# Patient Record
Sex: FEMALE | Race: WHITE | NOT HISPANIC OR LATINO | URBAN - NONMETROPOLITAN AREA
[De-identification: names, ages, dates, MRNs, and addresses within clinical notes are randomized per-mention and may not be internally consistent; named-entity substitution may affect disease eponyms.]

---

## 2017-02-16 ENCOUNTER — OFFICE VISIT (OUTPATIENT)
Dept: OBSTETRICS AND GYNECOLOGY | Facility: CLINIC | Age: 27
End: 2017-02-16

## 2017-02-16 VITALS
HEIGHT: 63 IN | DIASTOLIC BLOOD PRESSURE: 90 MMHG | WEIGHT: 246 LBS | SYSTOLIC BLOOD PRESSURE: 120 MMHG | BODY MASS INDEX: 43.59 KG/M2

## 2017-02-16 DIAGNOSIS — N97.0 INFERTILITY ASSOCIATED WITH ANOVULATION: ICD-10-CM

## 2017-02-16 DIAGNOSIS — E28.2 PCOS (POLYCYSTIC OVARIAN SYNDROME): ICD-10-CM

## 2017-02-16 DIAGNOSIS — E07.9 THYROID DYSFUNCTION: ICD-10-CM

## 2017-02-16 DIAGNOSIS — Z01.419 WELL WOMAN EXAM WITH ROUTINE GYNECOLOGICAL EXAM: Primary | ICD-10-CM

## 2017-02-16 PROCEDURE — 99213 OFFICE O/P EST LOW 20 MIN: CPT | Performed by: NURSE PRACTITIONER

## 2017-02-16 PROCEDURE — 99395 PREV VISIT EST AGE 18-39: CPT | Performed by: NURSE PRACTITIONER

## 2017-02-16 PROCEDURE — G0123 SCREEN CERV/VAG THIN LAYER: HCPCS | Performed by: NURSE PRACTITIONER

## 2017-02-16 RX ORDER — MEDROXYPROGESTERONE ACETATE 10 MG/1
10 TABLET ORAL DAILY
Qty: 30 TABLET | Refills: 3 | Status: SHIPPED | OUTPATIENT
Start: 2017-02-16 | End: 2017-02-26

## 2017-02-16 RX ORDER — SPIRONOLACTONE 50 MG/1
50 TABLET, FILM COATED ORAL 2 TIMES DAILY
Qty: 60 TABLET | Refills: 2 | Status: SHIPPED | OUTPATIENT
Start: 2017-02-16 | End: 2017-08-10 | Stop reason: SDUPTHER

## 2017-02-16 RX ORDER — SPIRONOLACTONE 50 MG/1
TABLET, FILM COATED ORAL
COMMUNITY
Start: 2016-11-16 | End: 2017-02-16 | Stop reason: SDUPTHER

## 2017-02-16 NOTE — PROGRESS NOTES
Subjective   Maritza Vazquez is a 27 y.o. female  YOB: 1990    Est PT is here today for yearly visit.  Pt states that she is doing good with no c/o  Pt does need order for Mammo today as well      Chief Complaint   Patient presents with   • Gynecologic Exam     Pt wants to discuss infertility. Pt needs refill on Metformin       HPI    The following portions of the patient's history were reviewed and updated as appropriate: allergies, current medications, past family history, past medical history, past social history, past surgical history and problem list.    No Known Allergies    Past Medical History   Diagnosis Date   • Goiter    • PCOS (polycystic ovarian syndrome)        Family History   Problem Relation Age of Onset   • No Known Problems Father    • No Known Problems Mother    • No Known Problems Brother    • Colon cancer Maternal Grandfather    • No Known Problems Brother    • Breast cancer Neg Hx    • Ovarian cancer Neg Hx        Social History     Social History   • Marital status:      Spouse name: N/A   • Number of children: N/A   • Years of education: N/A     Occupational History   • Not on file.     Social History Main Topics   • Smoking status: Current Every Day Smoker   • Smokeless tobacco: Not on file   • Alcohol use Yes   • Drug use: No   • Sexual activity: Yes     Other Topics Concern   • Not on file     Social History Narrative   • No narrative on file         Current Outpatient Prescriptions:   •  metFORMIN (GLUCOPHAGE) 1000 MG tablet, , Disp: , Rfl:   •  spironolactone (ALDACTONE) 50 MG tablet, , Disp: , Rfl:   •  clomiPHENE (CLOMID) 50 MG tablet, Take 1 tablet by mouth Daily for 5 days., Disp: 30 tablet, Rfl: 0  •  medroxyPROGESTERone (PROVERA) 10 MG tablet, Take 1 tablet by mouth Daily for 10 days., Disp: 30 tablet, Rfl: 3    Menstrual History:  OB History      Para Term  AB TAB SAB Ectopic Multiple Living    0 0 0 0 0 0 0 0 0 0           Patient's last  menstrual period was 02/01/2017 (approximate).    Sexual History:         Could not be calculated    Past Surgical History   Procedure Laterality Date   • Dilatation and curettage         Review of Systems   Constitutional: Negative for activity change, appetite change, chills, diaphoresis, fatigue, fever and unexpected weight change.   HENT: Negative for congestion, ear discharge, ear pain, facial swelling, hearing loss, mouth sores, nosebleeds, postnasal drip, rhinorrhea, sinus pressure, sneezing, sore throat, tinnitus, trouble swallowing and voice change.    Eyes: Negative for photophobia, pain, discharge, redness, itching and visual disturbance.   Respiratory: Negative for apnea, cough, choking, chest tightness and shortness of breath.    Cardiovascular: Negative for chest pain, palpitations and leg swelling.   Gastrointestinal: Negative for abdominal distention, abdominal pain, anal bleeding, blood in stool, constipation, diarrhea, nausea, rectal pain and vomiting.   Endocrine: Negative for cold intolerance and heat intolerance.   Genitourinary: Positive for menstrual problem (PCOS). Negative for decreased urine volume, difficulty urinating, dyspareunia, flank pain, frequency, genital sores, hematuria, pelvic pain, urgency, vaginal bleeding, vaginal discharge and vaginal pain.   Musculoskeletal: Negative for arthralgias, back pain, joint swelling and myalgias.   Skin: Negative for color change and rash.   Allergic/Immunologic: Negative for environmental allergies.   Neurological: Negative for dizziness, syncope, weakness, numbness and headaches.   Hematological: Negative for adenopathy.   Psychiatric/Behavioral: Negative for agitation, confusion and sleep disturbance. The patient is not nervous/anxious.        Objective   Physical Exam   Constitutional: She is oriented to person, place, and time. She appears well-developed and well-nourished. No distress.   HENT:   Head: Normocephalic.   Right Ear: External ear  normal.   Left Ear: External ear normal.   Nose: Nose normal.   Mouth/Throat: Oropharynx is clear and moist.   Eyes: Conjunctivae are normal. Right eye exhibits no discharge. Left eye exhibits no discharge.   Neck: Normal range of motion. Neck supple. Carotid bruit is not present. No tracheal deviation present. No thyromegaly present.   Cardiovascular: Normal rate, regular rhythm, normal heart sounds and intact distal pulses.    No murmur heard.  Pulmonary/Chest: Effort normal and breath sounds normal. No respiratory distress. She has no wheezes. Right breast exhibits no inverted nipple, no mass, no nipple discharge, no skin change and no tenderness. Left breast exhibits no inverted nipple, no mass, no nipple discharge, no skin change and no tenderness. Breasts are symmetrical. There is no breast swelling.   Abdominal: Soft. She exhibits no distension and no mass. There is no tenderness. There is no guarding. No hernia. Hernia confirmed negative in the right inguinal area and confirmed negative in the left inguinal area.   Genitourinary: Rectum normal, vagina normal and uterus normal. Rectal exam shows no external hemorrhoid, no internal hemorrhoid, no fissure, no mass, no tenderness and anal tone normal. No breast tenderness, discharge or bleeding. Pelvic exam was performed with patient supine. There is no rash, tenderness, lesion or injury on the right labia. There is no rash, tenderness, lesion or injury on the left labia. Uterus is not enlarged, not fixed and not tender. Cervix exhibits no motion tenderness, no discharge and no friability. Right adnexum displays no mass, no tenderness and no fullness. Left adnexum displays no mass, no tenderness and no fullness. No bleeding in the vagina. No vaginal discharge found.   Genitourinary Comments:   BSU normal  Urethral meatus  Normal  Perineum  Normal   Musculoskeletal: Normal range of motion. She exhibits no edema or tenderness.   Lymphadenopathy:        Head (right  "side): No submental, no submandibular, no tonsillar, no preauricular, no posterior auricular and no occipital adenopathy present.        Head (left side): No submental, no submandibular, no tonsillar, no preauricular, no posterior auricular and no occipital adenopathy present.     She has no cervical adenopathy.        Right cervical: No superficial cervical, no deep cervical and no posterior cervical adenopathy present.       Left cervical: No superficial cervical, no deep cervical and no posterior cervical adenopathy present.     She has no axillary adenopathy.        Right: No inguinal adenopathy present.        Left: No inguinal adenopathy present.   Neurological: She is alert and oriented to person, place, and time. Coordination normal.   Skin: Skin is warm and dry. No bruising and no rash noted. She is not diaphoretic. No erythema.   Psychiatric: She has a normal mood and affect. Her behavior is normal. Judgment and thought content normal.   Nursing note and vitals reviewed.        Vitals:    02/16/17 0939   BP: 120/90   Weight: 246 lb (112 kg)   Height: 63\" (160 cm)       Maritza was seen today for gynecologic exam.    Diagnoses and all orders for this visit:    Well woman exam with routine gynecological exam  Comments:  Normal well woman exam.  Thin prep done.    Orders:  -     Vitamin D 25 Hydroxy  -     T4, Free  -     Hemoglobin A1c  -     CBC & Differential  -     Comprehensive Metabolic Panel  -     Lipid Panel With LDL / HDL Ratio  -     TSH  -     Liquid-based Pap Smear, Screening    PCOS (polycystic ovarian syndrome)  Comments:  PCOS labs today - will adjust medications pending results.   Orders:  -     Comprehensive Metabolic Panel  -     Follicle Stimulating Hormone  -     Estradiol  -     Insulin, Total  -     Luteinizing Hormone  -     Progesterone  -     Testosterone  -     DHEA-Sulfate  -     Cortisol  -     Hemoglobin A1c  -     medroxyPROGESTERone (PROVERA) 10 MG tablet; Take 1 tablet by mouth " "Daily for 10 days.    Thyroid dysfunction  Comments:  Was on Synthroid at one time but has not been on meds in \"years\".  Will get thyroid labs today and follow up pending results.    Orders:  -     T4, Free  -     TSH    Infertility associated with anovulation  Comments:  Has not prevented pregnancy in 2 years.  Rarely has a period r/t PCOS.  Will induce bleeding with Provera and take Clomid 50 mg for 5 days beginning on day 5 of cycle.  Is on PNVs .  Patient to keep a bleeding calendar and will RTO to review in 3 months.    Orders:  -     clomiPHENE (CLOMID) 50 MG tablet; Take 1 tablet by mouth Daily for 5 days.  -     medroxyPROGESTERone (PROVERA) 10 MG tablet; Take 1 tablet by mouth Daily for 10 days.        Body mass index is 43.58 kg/(m^2).     Non-Smoker    MyChart Instructions Given       "

## 2017-02-17 LAB
25(OH)D3+25(OH)D2 SERPL-MCNC: 13.1 NG/ML (ref 30–100)
ALBUMIN SERPL-MCNC: 4.7 G/DL (ref 3.5–5)
ALBUMIN/GLOB SERPL: 1.6 G/DL (ref 1.1–2.5)
ALP SERPL-CCNC: 94 U/L (ref 24–120)
ALT SERPL-CCNC: 117 U/L (ref 0–54)
AST SERPL-CCNC: 81 U/L (ref 7–45)
BASOPHILS # BLD AUTO: 0.06 10*3/MM3 (ref 0–0.2)
BASOPHILS NFR BLD AUTO: 0.6 % (ref 0–2)
BILIRUB SERPL-MCNC: 0.6 MG/DL (ref 0.1–1)
BUN SERPL-MCNC: 8 MG/DL (ref 5–21)
BUN/CREAT SERPL: 11.4 (ref 7–25)
CALCIUM SERPL-MCNC: 9.7 MG/DL (ref 8.4–10.4)
CHLORIDE SERPL-SCNC: 102 MMOL/L (ref 98–110)
CHOLEST SERPL-MCNC: 174 MG/DL (ref 130–200)
CO2 SERPL-SCNC: 28 MMOL/L (ref 24–31)
CORTIS SERPL-MCNC: 5.2 UG/DL
CREAT SERPL-MCNC: 0.7 MG/DL (ref 0.5–1.4)
DHEA-S SERPL-MCNC: 246.7 UG/DL (ref 84.8–378)
EOSINOPHIL # BLD AUTO: 0.19 10*3/MM3 (ref 0–0.7)
EOSINOPHIL NFR BLD AUTO: 1.9 % (ref 0–4)
ERYTHROCYTE [DISTWIDTH] IN BLOOD BY AUTOMATED COUNT: 13.1 % (ref 12–15)
ESTRADIOL SERPL-MCNC: 37.9 PG/ML
FSH SERPL-ACNC: 4.3 MIU/ML
GLOBULIN SER CALC-MCNC: 3 GM/DL
GLUCOSE SERPL-MCNC: 78 MG/DL (ref 70–100)
HBA1C MFR BLD: 5.5 %
HCT VFR BLD AUTO: 42.6 % (ref 37–47)
HDLC SERPL-MCNC: 31 MG/DL
HGB BLD-MCNC: 13.9 G/DL (ref 12–16)
IMM GRANULOCYTES # BLD: 0.03 10*3/MM3 (ref 0–0.03)
IMM GRANULOCYTES NFR BLD: 0.3 % (ref 0–5)
INSULIN SERPL-ACNC: 53.9 UIU/ML (ref 2.6–24.9)
LDLC SERPL CALC-MCNC: 93 MG/DL (ref 0–99)
LDLC/HDLC SERPL: 3 {RATIO}
LH SERPL-ACNC: 7 MIU/ML
LYMPHOCYTES # BLD AUTO: 2.64 10*3/MM3 (ref 0.72–4.86)
LYMPHOCYTES NFR BLD AUTO: 26.9 % (ref 15–45)
MCH RBC QN AUTO: 30.3 PG (ref 28–32)
MCHC RBC AUTO-ENTMCNC: 32.6 G/DL (ref 33–36)
MCV RBC AUTO: 93 FL (ref 82–98)
MONOCYTES # BLD AUTO: 0.43 10*3/MM3 (ref 0.19–1.3)
MONOCYTES NFR BLD AUTO: 4.4 % (ref 4–12)
NEUTROPHILS # BLD AUTO: 6.45 10*3/MM3 (ref 1.87–8.4)
NEUTROPHILS NFR BLD AUTO: 65.9 % (ref 39–78)
PLATELET # BLD AUTO: 281 10*3/MM3 (ref 130–400)
POTASSIUM SERPL-SCNC: 4.4 MMOL/L (ref 3.5–5.3)
PROGEST SERPL-MCNC: <0.1 NG/ML
PROT SERPL-MCNC: 7.7 G/DL (ref 6.3–8.7)
RBC # BLD AUTO: 4.58 10*6/MM3 (ref 4.2–5.4)
SODIUM SERPL-SCNC: 140 MMOL/L (ref 135–145)
T4 FREE SERPL-MCNC: 1.12 NG/DL (ref 0.78–2.19)
TESTOST SERPL-MCNC: 55 NG/DL (ref 8–48)
TRIGL SERPL-MCNC: 250 MG/DL (ref 0–149)
TSH SERPL DL<=0.005 MIU/L-ACNC: 3.67 MIU/ML (ref 0.47–4.68)
VLDLC SERPL CALC-MCNC: 50 MG/DL
WBC # BLD AUTO: 9.8 10*3/MM3 (ref 4.8–10.8)

## 2017-02-24 LAB
GEN CATEG CVX/VAG CYTO-IMP: NORMAL
LAB AP CASE REPORT: NORMAL
LAB AP GYN ADDITIONAL INFORMATION: NORMAL
LAB AP GYN OTHER FINDINGS: NORMAL
Lab: NORMAL
PATH INTERP SPEC-IMP: NORMAL
STAT OF ADQ CVX/VAG CYTO-IMP: NORMAL

## 2017-02-28 DIAGNOSIS — B96.89 BACTERIAL VAGINOSIS: Primary | ICD-10-CM

## 2017-02-28 DIAGNOSIS — N76.0 BACTERIAL VAGINOSIS: Primary | ICD-10-CM

## 2017-03-02 ENCOUNTER — TELEPHONE (OUTPATIENT)
Dept: OBSTETRICS AND GYNECOLOGY | Facility: CLINIC | Age: 27
End: 2017-03-02

## 2017-03-02 NOTE — TELEPHONE ENCOUNTER
I spoke to patient yesterday and she said that now that she knew about savings card it would work fine.  If not though, we can send in Metrogel 1 applicatorful qhs times 5 days.

## 2017-03-02 NOTE — TELEPHONE ENCOUNTER
Pharmacy calls and states that the clindesse is not covered for patient and would like an alternative.

## 2017-05-18 ENCOUNTER — OFFICE VISIT (OUTPATIENT)
Dept: OBSTETRICS AND GYNECOLOGY | Facility: CLINIC | Age: 27
End: 2017-05-18

## 2017-05-18 VITALS — DIASTOLIC BLOOD PRESSURE: 84 MMHG | BODY MASS INDEX: 43.93 KG/M2 | SYSTOLIC BLOOD PRESSURE: 116 MMHG | WEIGHT: 248 LBS

## 2017-05-18 DIAGNOSIS — N97.0 INFERTILITY ASSOCIATED WITH ANOVULATION: Primary | ICD-10-CM

## 2017-05-18 PROCEDURE — 99213 OFFICE O/P EST LOW 20 MIN: CPT | Performed by: NURSE PRACTITIONER

## 2017-07-25 DIAGNOSIS — E66.3 OVERWEIGHT: Primary | ICD-10-CM

## 2017-07-25 RX ORDER — PHENTERMINE HYDROCHLORIDE 37.5 MG/1
37.5 CAPSULE ORAL EVERY MORNING
Qty: 30 CAPSULE | Refills: 0 | Status: SHIPPED | OUTPATIENT
Start: 2017-07-25 | End: 2017-08-24 | Stop reason: SDUPTHER

## 2017-08-10 DIAGNOSIS — E28.2 PCOS (POLYCYSTIC OVARIAN SYNDROME): ICD-10-CM

## 2017-08-10 RX ORDER — SPIRONOLACTONE 50 MG/1
50 TABLET, FILM COATED ORAL 2 TIMES DAILY
Qty: 60 TABLET | Refills: 2 | Status: SHIPPED | OUTPATIENT
Start: 2017-08-10 | End: 2017-10-23 | Stop reason: SDUPTHER

## 2017-08-24 ENCOUNTER — OFFICE VISIT (OUTPATIENT)
Dept: OBSTETRICS AND GYNECOLOGY | Facility: CLINIC | Age: 27
End: 2017-08-24

## 2017-08-24 VITALS
BODY MASS INDEX: 42.35 KG/M2 | DIASTOLIC BLOOD PRESSURE: 86 MMHG | HEIGHT: 63 IN | SYSTOLIC BLOOD PRESSURE: 128 MMHG | WEIGHT: 239 LBS

## 2017-08-24 PROCEDURE — 99213 OFFICE O/P EST LOW 20 MIN: CPT | Performed by: NURSE PRACTITIONER

## 2017-08-24 RX ORDER — PHENTERMINE HYDROCHLORIDE 37.5 MG/1
37.5 CAPSULE ORAL EVERY MORNING
Qty: 30 CAPSULE | Refills: 0 | Status: SHIPPED | OUTPATIENT
Start: 2017-08-24 | End: 2017-09-21 | Stop reason: SDUPTHER

## 2017-08-24 RX ORDER — PHENTERMINE HYDROCHLORIDE 37.5 MG/1
37.5 TABLET ORAL DAILY
Refills: 0 | COMMUNITY
Start: 2017-07-25 | End: 2017-09-21 | Stop reason: SDUPTHER

## 2017-08-24 NOTE — PROGRESS NOTES
Subjective   Maritza Vazquez is a 27 y.o. female.     History of Present Illness    The following portions of the patient's history were reviewed and updated as appropriate: allergies, current medications, past family history, past medical history, past social history, past surgical history and problem list.    Review of Systems   Constitutional: Negative for activity change, appetite change, chills, diaphoresis, fatigue, fever and unexpected weight change.   HENT: Negative for congestion, ear discharge, ear pain, facial swelling, hearing loss, mouth sores, nosebleeds, postnasal drip, rhinorrhea, sinus pressure, sneezing, sore throat, tinnitus, trouble swallowing and voice change.    Eyes: Negative for photophobia, pain, discharge, redness, itching and visual disturbance.   Respiratory: Negative for apnea, cough, choking, chest tightness and shortness of breath.    Cardiovascular: Negative for chest pain, palpitations and leg swelling.   Gastrointestinal: Negative for abdominal distention, abdominal pain, anal bleeding, blood in stool, constipation, diarrhea, nausea, rectal pain and vomiting.   Endocrine: Negative for cold intolerance and heat intolerance.   Genitourinary: Negative for decreased urine volume, difficulty urinating, dyspareunia, flank pain, frequency, genital sores, hematuria, menstrual problem (amenorrhea), pelvic pain, urgency, vaginal bleeding, vaginal discharge and vaginal pain.   Musculoskeletal: Negative for arthralgias, back pain, joint swelling and myalgias.   Skin: Negative for color change and rash.   Allergic/Immunologic: Negative for environmental allergies.   Neurological: Negative for dizziness, syncope, weakness, numbness and headaches.   Hematological: Negative for adenopathy.   Psychiatric/Behavioral: Negative for agitation, confusion and sleep disturbance. The patient is not nervous/anxious.        Objective   Physical Exam   Constitutional: She is oriented to person, place, and time.  She appears well-developed and well-nourished.   Cardiovascular: Normal rate and regular rhythm.    Pulmonary/Chest: Effort normal and breath sounds normal.   Neurological: She is alert and oriented to person, place, and time.   Psychiatric: She has a normal mood and affect. Her behavior is normal.   Nursing note and vitals reviewed.      Assessment/Plan   Maritza was seen today for weight gain.    Diagnoses and all orders for this visit:    BMI 40.0-44.9, adult  Comments:  Doing well on Phentermine and wants to remain on it.  RX given.  RTO 4 weeks or sooner prn.   Orders:  -     phentermine 37.5 MG capsule; Take 1 capsule by mouth Every Morning.

## 2017-09-21 ENCOUNTER — OFFICE VISIT (OUTPATIENT)
Dept: OBSTETRICS AND GYNECOLOGY | Facility: CLINIC | Age: 27
End: 2017-09-21

## 2017-09-21 VITALS
WEIGHT: 235 LBS | SYSTOLIC BLOOD PRESSURE: 128 MMHG | DIASTOLIC BLOOD PRESSURE: 88 MMHG | HEIGHT: 63 IN | BODY MASS INDEX: 41.64 KG/M2

## 2017-09-21 PROCEDURE — 99213 OFFICE O/P EST LOW 20 MIN: CPT | Performed by: NURSE PRACTITIONER

## 2017-09-21 RX ORDER — PHENTERMINE HYDROCHLORIDE 37.5 MG/1
37.5 CAPSULE ORAL EVERY MORNING
Qty: 30 CAPSULE | Refills: 0 | Status: SHIPPED | OUTPATIENT
Start: 2017-09-21 | End: 2017-10-19 | Stop reason: SDUPTHER

## 2017-09-21 NOTE — PROGRESS NOTES
Subjective   Maritza Vazquez is a 27 y.o. female.     History of Present Illness    The following portions of the patient's history were reviewed and updated as appropriate: allergies, current medications, past family history, past medical history, past social history, past surgical history and problem list.    Review of Systems   Constitutional: Negative for activity change, appetite change, chills, diaphoresis, fatigue, fever and unexpected weight change.   HENT: Negative for congestion, ear discharge, ear pain, facial swelling, hearing loss, mouth sores, nosebleeds, postnasal drip, rhinorrhea, sinus pressure, sneezing, sore throat, tinnitus, trouble swallowing and voice change.    Eyes: Negative for photophobia, pain, discharge, redness, itching and visual disturbance.   Respiratory: Negative for apnea, cough, choking, chest tightness and shortness of breath.    Cardiovascular: Negative for chest pain, palpitations and leg swelling.   Gastrointestinal: Negative for abdominal distention, abdominal pain, anal bleeding, blood in stool, constipation, diarrhea, nausea, rectal pain and vomiting.   Endocrine: Negative for cold intolerance and heat intolerance.   Genitourinary: Negative for decreased urine volume, difficulty urinating, dyspareunia, flank pain, frequency, genital sores, hematuria, menstrual problem, pelvic pain, urgency, vaginal bleeding, vaginal discharge and vaginal pain.   Musculoskeletal: Negative for arthralgias, back pain, joint swelling and myalgias.   Skin: Negative for color change and rash.   Allergic/Immunologic: Negative for environmental allergies.   Neurological: Negative for dizziness, syncope, weakness, numbness and headaches.   Hematological: Negative for adenopathy.   Psychiatric/Behavioral: Negative for agitation, confusion and sleep disturbance. The patient is not nervous/anxious.        Objective   Physical Exam   Constitutional: She is oriented to person, place, and time. She appears  well-developed and well-nourished.   Cardiovascular: Normal rate and regular rhythm.    Pulmonary/Chest: Effort normal and breath sounds normal.   Neurological: She is alert and oriented to person, place, and time.   Psychiatric: She has a normal mood and affect. Her behavior is normal.   Nursing note and vitals reviewed.      Assessment/Plan   Maritza was seen today for weight gain.    Diagnoses and all orders for this visit:    BMI 40.0-44.9, adult  Comments:  Doing well on Phentermine and wants to remain on it.  Has lost 4 pounds since last visit for a total of 13 pounds. RX given.  RTO 4 weeks or sooner prn.   Orders:  -     phentermine 37.5 MG capsule; Take 1 capsule by mouth Every Morning.

## 2017-10-19 ENCOUNTER — OFFICE VISIT (OUTPATIENT)
Dept: OBSTETRICS AND GYNECOLOGY | Facility: CLINIC | Age: 27
End: 2017-10-19

## 2017-10-19 VITALS
DIASTOLIC BLOOD PRESSURE: 88 MMHG | HEIGHT: 63 IN | WEIGHT: 232 LBS | BODY MASS INDEX: 41.11 KG/M2 | SYSTOLIC BLOOD PRESSURE: 110 MMHG

## 2017-10-19 DIAGNOSIS — E28.2 PCOS (POLYCYSTIC OVARIAN SYNDROME): Primary | ICD-10-CM

## 2017-10-19 PROCEDURE — 99213 OFFICE O/P EST LOW 20 MIN: CPT | Performed by: NURSE PRACTITIONER

## 2017-10-19 RX ORDER — PHENTERMINE HYDROCHLORIDE 37.5 MG/1
37.5 CAPSULE ORAL EVERY MORNING
Qty: 30 CAPSULE | Refills: 0 | Status: SHIPPED | OUTPATIENT
Start: 2017-10-19 | End: 2023-03-29

## 2017-10-19 NOTE — PROGRESS NOTES
Subjective   Maritza Vazquez is a 27 y.o. female.     History of Present Illness    The following portions of the patient's history were reviewed and updated as appropriate: allergies, current medications, past family history, past medical history, past social history, past surgical history and problem list.    Review of Systems   Constitutional: Negative for activity change, appetite change, chills, diaphoresis, fatigue, fever and unexpected weight change.   HENT: Negative for congestion, ear discharge, ear pain, facial swelling, hearing loss, mouth sores, nosebleeds, postnasal drip, rhinorrhea, sinus pressure, sneezing, sore throat, tinnitus, trouble swallowing and voice change.    Eyes: Negative for photophobia, pain, discharge, redness, itching and visual disturbance.   Respiratory: Negative for apnea, cough, choking, chest tightness and shortness of breath.    Cardiovascular: Negative for chest pain, palpitations and leg swelling.   Gastrointestinal: Negative for abdominal distention, abdominal pain, anal bleeding, blood in stool, constipation, diarrhea, nausea, rectal pain and vomiting.   Endocrine: Negative for cold intolerance and heat intolerance.   Genitourinary: Negative for decreased urine volume, difficulty urinating, dyspareunia, flank pain, frequency, genital sores, hematuria, menstrual problem, pelvic pain, urgency, vaginal bleeding, vaginal discharge and vaginal pain.   Musculoskeletal: Negative for arthralgias, back pain, joint swelling and myalgias.   Skin: Negative for color change and rash.   Allergic/Immunologic: Negative for environmental allergies.   Neurological: Negative for dizziness, syncope, weakness, numbness and headaches.   Hematological: Negative for adenopathy.   Psychiatric/Behavioral: Negative for agitation, confusion and sleep disturbance. The patient is not nervous/anxious.        Objective   Physical Exam    Assessment/Plan   Maritza was seen today for weight gain.    Diagnoses and  all orders for this visit:    PCOS (polycystic ovarian syndrome)  Comments:  Patient is on metformi and spironolactone.  PCOS panel done today and will adjust medicine dosages accordingly when results are here.    Orders:  -     Comprehensive Metabolic Panel  -     Estradiol  -     Follicle Stimulating Hormone  -     Insulin, Total  -     Luteinizing Hormone  -     Progesterone  -     Testosterone  -     DHEA-Sulfate  -     Cortisol  -     Hemoglobin A1c    BMI 40.0-44.9, adult  Comments:  Doing well on Phentermine and wants to remain on it.  Has lost 3 pounds since last visit for a total of 16 pounds. RX given.  RTO 4 weeks or sooner prn.   Orders:  -     phentermine 37.5 MG capsule; Take 1 capsule by mouth Every Morning.

## 2017-10-20 LAB
ALBUMIN SERPL-MCNC: 4.8 G/DL (ref 3.5–5)
ALBUMIN/GLOB SERPL: 1.4 G/DL (ref 1.1–2.5)
ALP SERPL-CCNC: 96 U/L (ref 24–120)
ALT SERPL-CCNC: 120 U/L (ref 0–54)
AST SERPL-CCNC: 84 U/L (ref 7–45)
BILIRUB SERPL-MCNC: 0.6 MG/DL (ref 0.1–1)
BUN SERPL-MCNC: 12 MG/DL (ref 5–21)
BUN/CREAT SERPL: 15.6 (ref 7–25)
CALCIUM SERPL-MCNC: 10 MG/DL (ref 8.4–10.4)
CHLORIDE SERPL-SCNC: 102 MMOL/L (ref 98–110)
CO2 SERPL-SCNC: 25 MMOL/L (ref 24–31)
CORTIS SERPL-MCNC: 6.9 UG/DL
CREAT SERPL-MCNC: 0.77 MG/DL (ref 0.5–1.4)
DHEA-S SERPL-MCNC: 326.2 UG/DL (ref 84.8–378)
ESTRADIOL SERPL-MCNC: 54.1 PG/ML
FSH SERPL-ACNC: 4.9 MIU/ML
GFR SERPLBLD CREATININE-BSD FMLA CKD-EPI: 108 ML/MIN/1.73
GFR SERPLBLD CREATININE-BSD FMLA CKD-EPI: 89 ML/MIN/1.73
GLOBULIN SER CALC-MCNC: 3.4 GM/DL
GLUCOSE SERPL-MCNC: 85 MG/DL (ref 70–100)
HBA1C MFR BLD: 5.1 %
INSULIN SERPL-ACNC: 39 UIU/ML (ref 2.6–24.9)
LH SERPL-ACNC: 9.3 MIU/ML
POTASSIUM SERPL-SCNC: 4.9 MMOL/L (ref 3.5–5.3)
PROGEST SERPL-MCNC: 0.1 NG/ML
PROT SERPL-MCNC: 8.2 G/DL (ref 6.3–8.7)
SODIUM SERPL-SCNC: 140 MMOL/L (ref 135–145)
TESTOST SERPL-MCNC: 68 NG/DL (ref 8–48)

## 2017-10-23 DIAGNOSIS — E28.2 PCOS (POLYCYSTIC OVARIAN SYNDROME): ICD-10-CM

## 2017-10-23 RX ORDER — SPIRONOLACTONE 50 MG/1
50 TABLET, FILM COATED ORAL 2 TIMES DAILY
Qty: 90 TABLET | Refills: 2 | Status: SHIPPED | OUTPATIENT
Start: 2017-10-23 | End: 2017-12-20 | Stop reason: SDUPTHER

## 2017-12-20 DIAGNOSIS — E28.2 PCOS (POLYCYSTIC OVARIAN SYNDROME): ICD-10-CM

## 2017-12-20 RX ORDER — SPIRONOLACTONE 50 MG/1
50 TABLET, FILM COATED ORAL 2 TIMES DAILY
Qty: 180 TABLET | Refills: 2 | Status: SHIPPED | OUTPATIENT
Start: 2017-12-20 | End: 2017-12-27 | Stop reason: SDUPTHER

## 2017-12-27 DIAGNOSIS — E28.2 PCOS (POLYCYSTIC OVARIAN SYNDROME): ICD-10-CM

## 2017-12-27 RX ORDER — SPIRONOLACTONE 50 MG/1
50 TABLET, FILM COATED ORAL 2 TIMES DAILY
Qty: 180 TABLET | Refills: 2 | Status: SHIPPED | OUTPATIENT
Start: 2017-12-27 | End: 2023-03-29

## 2017-12-27 NOTE — TELEPHONE ENCOUNTER
demario in [place] calling requesting refill for pt. I told them it was sent to [facility] but pt wanted them at

## 2018-06-02 DIAGNOSIS — E28.2 PCOS (POLYCYSTIC OVARIAN SYNDROME): ICD-10-CM

## 2018-06-04 NOTE — TELEPHONE ENCOUNTER
Please review rx request. Pt had labs in 10/2017 but I cannot see any annotation on dosing or when to come back in for office visit and labs?

## 2018-07-04 DIAGNOSIS — E28.2 PCOS (POLYCYSTIC OVARIAN SYNDROME): ICD-10-CM

## 2018-07-30 DIAGNOSIS — E28.2 PCOS (POLYCYSTIC OVARIAN SYNDROME): ICD-10-CM

## 2018-12-26 ENCOUNTER — TELEPHONE (OUTPATIENT)
Dept: NEUROLOGY | Age: 28
End: 2018-12-26

## 2023-03-29 ENCOUNTER — INITIAL PRENATAL (OUTPATIENT)
Dept: OBSTETRICS AND GYNECOLOGY | Facility: CLINIC | Age: 33
End: 2023-03-29
Payer: MEDICAID

## 2023-03-29 ENCOUNTER — PATIENT MESSAGE (OUTPATIENT)
Dept: OBSTETRICS AND GYNECOLOGY | Facility: CLINIC | Age: 33
End: 2023-03-29

## 2023-03-29 VITALS — DIASTOLIC BLOOD PRESSURE: 88 MMHG | WEIGHT: 237 LBS | SYSTOLIC BLOOD PRESSURE: 124 MMHG | BODY MASS INDEX: 41.98 KG/M2

## 2023-03-29 DIAGNOSIS — Z34.91 PRENATAL CARE IN FIRST TRIMESTER: Primary | ICD-10-CM

## 2023-03-29 DIAGNOSIS — B37.31 YEAST VAGINITIS: ICD-10-CM

## 2023-03-29 DIAGNOSIS — N76.0 ACUTE VAGINITIS: ICD-10-CM

## 2023-03-29 PROCEDURE — 87563 M. GENITALIUM AMP PROBE: CPT | Performed by: NURSE PRACTITIONER

## 2023-03-29 PROCEDURE — 87798 DETECT AGENT NOS DNA AMP: CPT | Performed by: NURSE PRACTITIONER

## 2023-03-29 PROCEDURE — 87481 CANDIDA DNA AMP PROBE: CPT | Performed by: NURSE PRACTITIONER

## 2023-03-29 PROCEDURE — 87512 GARDNER VAG DNA QUANT: CPT | Performed by: NURSE PRACTITIONER

## 2023-03-29 PROCEDURE — 87661 TRICHOMONAS VAGINALIS AMPLIF: CPT | Performed by: NURSE PRACTITIONER

## 2023-03-29 RX ORDER — LEVOTHYROXINE SODIUM 0.05 MG/1
TABLET ORAL
COMMUNITY
Start: 2023-03-10

## 2023-03-29 RX ORDER — ALBUTEROL SULFATE 1.25 MG/3ML
SOLUTION RESPIRATORY (INHALATION)
COMMUNITY
Start: 2023-03-23

## 2023-03-29 RX ORDER — VALACYCLOVIR HYDROCHLORIDE 1 G/1
TABLET, FILM COATED ORAL
COMMUNITY
Start: 2022-12-16

## 2023-03-29 RX ORDER — PROPRANOLOL HYDROCHLORIDE 20 MG/1
TABLET ORAL
COMMUNITY

## 2023-03-29 RX ORDER — ERGOCALCIFEROL 1.25 MG/1
CAPSULE ORAL
COMMUNITY

## 2023-04-03 DIAGNOSIS — B37.31 YEAST VAGINITIS: Primary | ICD-10-CM

## 2023-04-03 LAB
LAB AP CASE REPORT: NORMAL
Lab: NORMAL
PATH INTERP SPEC-IMP: NORMAL
TRICHOMONAS VAGINALIS PCR: NOT DETECTED

## 2023-04-03 RX ORDER — FLUCONAZOLE 150 MG/1
150 TABLET ORAL DAILY
Qty: 2 TABLET | Refills: 0 | Status: SHIPPED | OUTPATIENT
Start: 2023-04-03 | End: 2023-04-03

## 2023-04-03 RX ORDER — FLUCONAZOLE 150 MG/1
150 TABLET ORAL DAILY
Qty: 2 TABLET | Refills: 0 | Status: SHIPPED | OUTPATIENT
Start: 2023-04-03

## 2023-04-04 ENCOUNTER — REFERRAL TRIAGE (OUTPATIENT)
Dept: LABOR AND DELIVERY | Facility: HOSPITAL | Age: 33
End: 2023-04-04
Payer: MEDICAID

## 2023-04-05 LAB
ABO GROUP BLD: NORMAL
BACTERIA UR CULT: NORMAL
BACTERIA UR CULT: NORMAL
BASOPHILS # BLD AUTO: 0.05 10*3/MM3 (ref 0–0.2)
BASOPHILS NFR BLD AUTO: 0.5 % (ref 0–1.5)
BLD GP AB SCN SERPL QL: NEGATIVE
C TRACH RRNA SPEC QL NAA+PROBE: NEGATIVE
DRUGS UR: NORMAL
EOSINOPHIL # BLD AUTO: 0.18 10*3/MM3 (ref 0–0.4)
EOSINOPHIL NFR BLD AUTO: 1.9 % (ref 0.3–6.2)
ERYTHROCYTE [DISTWIDTH] IN BLOOD BY AUTOMATED COUNT: 12.6 % (ref 12.3–15.4)
HBV SURFACE AG SERPL QL IA: NEGATIVE
HCT VFR BLD AUTO: 39.2 % (ref 34–46.6)
HCV IGG SERPL QL IA: NON REACTIVE
HGB BLD-MCNC: 12.8 G/DL (ref 12–15.9)
HIV 1+2 AB+HIV1 P24 AG SERPL QL IA: NON REACTIVE
IMM GRANULOCYTES # BLD AUTO: 0.02 10*3/MM3 (ref 0–0.05)
IMM GRANULOCYTES NFR BLD AUTO: 0.2 % (ref 0–0.5)
LYMPHOCYTES # BLD AUTO: 2.27 10*3/MM3 (ref 0.7–3.1)
LYMPHOCYTES NFR BLD AUTO: 24.5 % (ref 19.6–45.3)
MCH RBC QN AUTO: 30 PG (ref 26.6–33)
MCHC RBC AUTO-ENTMCNC: 32.7 G/DL (ref 31.5–35.7)
MCV RBC AUTO: 92 FL (ref 79–97)
MONOCYTES # BLD AUTO: 0.49 10*3/MM3 (ref 0.1–0.9)
MONOCYTES NFR BLD AUTO: 5.3 % (ref 5–12)
N GONORRHOEA RRNA SPEC QL NAA+PROBE: NEGATIVE
NEUTROPHILS # BLD AUTO: 6.25 10*3/MM3 (ref 1.7–7)
NEUTROPHILS NFR BLD AUTO: 67.6 % (ref 42.7–76)
NRBC BLD AUTO-RTO: 0 /100 WBC (ref 0–0.2)
PLATELET # BLD AUTO: 328 10*3/MM3 (ref 140–450)
RBC # BLD AUTO: 4.26 10*6/MM3 (ref 3.77–5.28)
RH BLD: POSITIVE
RPR SER QL: NON REACTIVE
RUBV IGG SERPL IA-ACNC: 2.68 INDEX
WBC # BLD AUTO: 9.26 10*3/MM3 (ref 3.4–10.8)

## 2023-04-12 NOTE — PROGRESS NOTES
New OB visit-plan of care reviewed.  New OB info given.  G1, P0.  Ultrasound done today shows viable pregnancy of 7W3D with FHT of 153.  Patient reports symptoms of a yeast infection.  Wet prep done and positive for yeast.  Rx sent to pharmacy.  New OB labs done.  ER precautions given RTO in 4 weeks for follow-up or sooner as needed.

## 2023-04-27 ENCOUNTER — ROUTINE PRENATAL (OUTPATIENT)
Dept: OBSTETRICS AND GYNECOLOGY | Facility: CLINIC | Age: 33
End: 2023-04-27

## 2023-04-27 VITALS — BODY MASS INDEX: 41.98 KG/M2 | WEIGHT: 237 LBS | SYSTOLIC BLOOD PRESSURE: 112 MMHG | DIASTOLIC BLOOD PRESSURE: 74 MMHG

## 2023-04-27 DIAGNOSIS — Z34.01 ENCOUNTER FOR SUPERVISION OF NORMAL FIRST PREGNANCY IN FIRST TRIMESTER: Primary | ICD-10-CM

## 2023-04-27 DIAGNOSIS — Z13.71 SCREENING FOR GENETIC DISEASE CARRIER STATUS: ICD-10-CM

## 2023-04-27 LAB
GLUCOSE UR STRIP-MCNC: NEGATIVE MG/DL
PROT UR STRIP-MCNC: NEGATIVE MG/DL

## 2023-04-27 NOTE — PROGRESS NOTES
Feeling well, no nausea  On Metformin for PCOS but noticed blood sugar increase when she stopped it  Reviewed dating ultrasound  Reviewed normal prenatal labs  ffDNA and maternal carrier screening today  HgbA1c today    Diagnoses and all orders for this visit:    1. Encounter for supervision of normal first pregnancy in first trimester (Primary)  -     [place] Prenatal Test: Chromosomes 13, 18, 21, X & Y: Triploidy 22Q.11.2 Deletion - Blood,  -     Hemoglobin A1c    2. Screening for genetic disease carrier status  -     [facility] Horizon 14 (Pan-Ethnic Standard) - Blood,

## 2023-04-28 LAB — HBA1C MFR BLD: 6.4 % (ref 4.8–5.6)

## 2023-05-08 LAB
Lab: NEGATIVE
Lab: NORMAL
NTRA ALPHA-THALASSEMIA: NEGATIVE
NTRA BETA-HEMOGLOBINOPATHIES: NEGATIVE
NTRA CANAVAN DISEASE: NEGATIVE
NTRA CYSTIC FIBROSIS: NEGATIVE
NTRA DUCHENNE/BECKER MUSCULAR DYSTROPHY: NEGATIVE
NTRA FAMILIAL DYSAUTONOMIA: NEGATIVE
NTRA FRAGILE X SYNDROME: NEGATIVE
NTRA GALACTOSEMIA: NEGATIVE
NTRA GAUCHER DISEASE: NEGATIVE
NTRA MEDIUM CHAIN ACYL-COA DEHYDROGENASE DEFICIENCY: NEGATIVE
NTRA POLYCYSTIC KIDNEY DISEASE, AUTOSOMAL RECESSIVE: NEGATIVE
NTRA SMITH-LEMLI-OPITZ SYNDROME: NEGATIVE
NTRA SPINAL MUSCULAR ATROPHY: NEGATIVE
NTRA TAY-SACHS DISEASE: NEGATIVE

## 2023-05-10 ENCOUNTER — PATIENT OUTREACH (OUTPATIENT)
Dept: LABOR AND DELIVERY | Facility: HOSPITAL | Age: 33
End: 2023-05-10

## 2023-05-10 NOTE — OUTREACH NOTE
[facility]  Enrollment    Current Estimated Gestational Age: 13w3d    Questions/Answers      Flowsheet Row Responses   Would like to participate? Yes   Date of Intake Visit 05/10/23            [facility]  Intake    Current Estimated Gestational Age: 13w3d    Intake Assessment      Flowsheet Row Responses   Best Method for Contacting Cell   Currently Employed Yes   Able to keep appointments as scheduled Yes   Do you have a dentist? Yes  [dental clinics sent to client in resource letter]   Have you seen a dentist in the last 6 months Yes   Resources Presently Utilizing: None   Maternal Warning Signs Provided  [sent to client attached to resource letter]   Other: Provided  [Second trimester of pregnancy and round ligament pain sent to client in AVS]   Other Education HANDS, How to find a dentist, How to find a pediatrician, How to find a primary care provider, Meds to Beds, Mental Health Services, Smoking/Vaping Cessation, SNAP Benefits, Substance Use Disorder Treatment, WIC Benefits            Learning Assessment      Flowsheet Row Responses   Relationship Patient   Does the learner have any barriers to learning? No Barriers   What is the preferred language of the learner for medical teaching? English   How does the learner prefer to learn new concepts? Listening, Reading, Demonstration, Pictures/Video                Referral submitted to the following resources (verbal consent received to submit demographic information):     HANDS    Resource letter with maternal warning signs attached sent to client in her mychart.  SDOH questionnaire sent to client in her mychart.      Nuris Cabrera RN  Maternity Nurse Navigator    5/10/2023, 12:41 CDT            Nuris Cabrera RN  Maternity Nurse Navigator    5/10/2023, 12:41 CDT

## 2023-05-30 ENCOUNTER — ROUTINE PRENATAL (OUTPATIENT)
Dept: OBSTETRICS AND GYNECOLOGY | Facility: CLINIC | Age: 33
End: 2023-05-30

## 2023-05-30 VITALS — SYSTOLIC BLOOD PRESSURE: 126 MMHG | WEIGHT: 242 LBS | DIASTOLIC BLOOD PRESSURE: 72 MMHG | BODY MASS INDEX: 42.87 KG/M2

## 2023-05-30 DIAGNOSIS — R73.09 ELEVATED HEMOGLOBIN A1C: ICD-10-CM

## 2023-05-30 DIAGNOSIS — Z3A.16 16 WEEKS GESTATION OF PREGNANCY: ICD-10-CM

## 2023-05-30 DIAGNOSIS — Z34.02 PRIMIGRAVIDA, SECOND TRIMESTER: Primary | ICD-10-CM

## 2023-05-30 DIAGNOSIS — O99.330 TOBACCO USE DURING PREGNANCY, ANTEPARTUM: ICD-10-CM

## 2023-05-30 PROBLEM — Z34.00 PRIMIGRAVIDA: Status: ACTIVE | Noted: 2023-05-30

## 2023-05-30 RX ORDER — CALCIUM CITRATE/VITAMIN D3 200MG-6.25
TABLET ORAL
COMMUNITY
Start: 2023-05-16

## 2023-05-30 NOTE — PROGRESS NOTES
Reason for visit: Routine OB visit at 16w2d. SAMANTHA 2023, by Ultrasound    CC:  Patient reports she has just received the needles to start checking her blood sugars.. Denies VB, LOF, pelvic pain, or cramping.     ROS: All systems reviewed and are negative.    Wt 242 lb for a TWG of 2.268 kg (5 lb), /72, FHTs 136  Urine today and reviewed: 2+ glucose, negative protein    Anatomy Scan: scheduled for 2023    Exam:  General Appearance:  Healthy appearing . Normal mood and behavior.  HEENT: NCAT, EOMI  HR str and reg. Lungs clear. Resp even and unlabored.  Abdomen is soft and nontender. Bowel sounds active. Uterus is consistent with EGA  Ext: no edema, nontender, no trauma, or cyanosis.    Impression  Diagnoses and all orders for this visit:    1. Primigravida, second trimester (Primary)  - Discussed second trimester of pregnancy, discomforts and measures of support, fetal movement, pelvic pain warnings, bleeding warnings, and signs and symptoms to report. Second Trimester of Pregnancy video included in the AVS. Round Ligament Pain education included in the AVS.  - Reviewed ffDNA and Maternal carrier screening results: low risk and negative for 14 conditions.   - Discussed and encouraged to call or come to the hospital with vaginal bleeding, leaking of fluid, pelvic pain, or any other concerns.  - Return to the office in 4 weeks for a routine OB visit with Dr. Galvez and as needed with concerns.    2. 16 weeks gestation of pregnancy    3. Elevated hemoglobin A1c  - Hemoglobin A1c 6.4% at 12-weeks gestation. Discussed checking blood sugars, fasting and 1-hour postprandial. Also discussed plan for early glucose tolerance test at her next appointment.     4. Tobacco use during pregnancy, antepartum          This note has been signed electronically.    Daya Sutton, DNP, APRN, CNM, RNC-OB

## 2023-06-04 ENCOUNTER — PATIENT MESSAGE (OUTPATIENT)
Dept: OBSTETRICS AND GYNECOLOGY | Facility: CLINIC | Age: 33
End: 2023-06-04

## 2023-06-06 RX ORDER — SERTRALINE HYDROCHLORIDE 25 MG/1
25 TABLET, FILM COATED ORAL DAILY
Qty: 30 TABLET | Refills: 3 | Status: SHIPPED | OUTPATIENT
Start: 2023-06-06

## 2023-06-07 ENCOUNTER — TELEPHONE (OUTPATIENT)
Dept: OBSTETRICS AND GYNECOLOGY | Facility: CLINIC | Age: 33
End: 2023-06-07

## 2023-06-07 NOTE — TELEPHONE ENCOUNTER
Called patient to go over maternity payment plan, patient did not answer and I did leave a voicemail to give us a call back.

## 2023-07-18 PROBLEM — O24.919: Status: ACTIVE | Noted: 2023-07-18

## 2023-07-25 ENCOUNTER — ROUTINE PRENATAL (OUTPATIENT)
Dept: OBSTETRICS AND GYNECOLOGY | Facility: CLINIC | Age: 33
End: 2023-07-25

## 2023-07-25 VITALS — SYSTOLIC BLOOD PRESSURE: 132 MMHG | DIASTOLIC BLOOD PRESSURE: 70 MMHG | WEIGHT: 243 LBS | BODY MASS INDEX: 43.05 KG/M2

## 2023-07-25 DIAGNOSIS — Z34.02 PRIMIGRAVIDA, SECOND TRIMESTER: ICD-10-CM

## 2023-07-25 DIAGNOSIS — O24.919 DIABETES MELLITUS AFFECTING PREGNANCY, ANTEPARTUM: ICD-10-CM

## 2023-07-25 DIAGNOSIS — Z3A.24 24 WEEKS GESTATION OF PREGNANCY: Primary | ICD-10-CM

## 2023-07-25 DIAGNOSIS — Z87.891 FORMER SMOKER: ICD-10-CM

## 2023-07-25 LAB
GLUCOSE UR STRIP-MCNC: NEGATIVE MG/DL
PROT UR STRIP-MCNC: NEGATIVE MG/DL

## 2023-07-25 PROCEDURE — 99214 OFFICE O/P EST MOD 30 MIN: CPT | Performed by: ADVANCED PRACTICE MIDWIFE

## 2023-07-25 NOTE — PROGRESS NOTES
Reason for visit: Routine OB visit at 24w2d     CC:  Patient reports feeling good fetal movement.  Denies VB, LOF, contractions, h/a, visual changes, and right upper quadrant pain.     ROS: All systems reviewed and are negative.    Wt 243 lb for a TWG of 2.722 kg (6 lb), /70, FHTs 151, FH 25 cm  Urine today and reviewed: negative glucose, negative protein     24-week Follow-up Anatomy scan: completed today. No report yet available  20-week Anatomy scan: limited, otherwise normal; anterior fundal placenta without evidence of placenta previa    Exam:  General Appearance:  Healthy appearing . Normal mood and behavior.  HEENT: NCAT, EOMI  HR str and reg. Lungs clear. Resp even and unlabored.  Abdomen is soft and nontender. Uterus is consistent with EGA  Ext: no edema, nontender, no trauma, or cyanosis.    Impression  Diagnoses and all orders for this visit:    1. 24 weeks gestation of pregnancy (Primary)  -     POC Urinalysis Dipstick    2. Primigravida, second trimester  - Discussed second trimester of pregnancy, discomforts and measures of support, fetal movement,  labor warnings, preeclampsia warnings, and signs and symptoms to report. Second Trimester of Pregnancy video and Round Ligament Pain education included in the AVS.  - Discussed plan for hemoglobin for the next visit.  - Discussed and encouraged to call or come to the hospital for vaginal bleeding, leaking of fluid, contractions, or any other concerns.  - Return to the office in four weeks with Dr. Galvez with 4D ultrasound and as needed with concerns.    3. Diabetes mellitus affecting pregnancy, antepartum  - She did not have her blood sugar log today, but she reports her two hour postprandial blood sugars have still been greater than 120, except for a couple. She reports they have been less than 200. She states her fasting blood sugars have still been greater than 95. She is going to the gym 2-3 days per week. She is trying to watch  "what she eats. Trying to also get something set up at home to help also since they live 40\" from the gym.   - Discussed medication options and patient prefers to try increasing Metformin prior to changing or adding treatment with insulin. Encouraged patient to bring blood sugar log to next appointment. Diabetes Mellitus and Exercise education and Gestational Diabetes Mellitus Diagnosis video included in the AVS.   -     metFORMIN (GLUCOPHAGE) 500 MG tablet; Take 2 tablets by mouth 2 (Two) Times a Day With Meals.  Dispense: 120 tablet; Refill: 3    4. Former smoker          This note has been signed electronically.    Daya Sutton, DNP, APRN, CNM, RNC-OB    "

## 2023-08-22 ENCOUNTER — PATIENT OUTREACH (OUTPATIENT)
Dept: LABOR AND DELIVERY | Facility: HOSPITAL | Age: 33
End: 2023-08-22
Payer: MEDICAID

## 2023-08-22 ENCOUNTER — ROUTINE PRENATAL (OUTPATIENT)
Dept: OBSTETRICS AND GYNECOLOGY | Facility: CLINIC | Age: 33
End: 2023-08-22

## 2023-08-22 VITALS — DIASTOLIC BLOOD PRESSURE: 74 MMHG | BODY MASS INDEX: 42.69 KG/M2 | WEIGHT: 241 LBS | SYSTOLIC BLOOD PRESSURE: 110 MMHG

## 2023-08-22 DIAGNOSIS — O24.919 DIABETES MELLITUS AFFECTING PREGNANCY, ANTEPARTUM: Primary | ICD-10-CM

## 2023-08-22 PROCEDURE — 99213 OFFICE O/P EST LOW 20 MIN: CPT | Performed by: OBSTETRICS & GYNECOLOGY

## 2023-08-22 NOTE — OUTREACH NOTE
[facility]  Check-In    Current Estimated Gestational Age: 28w2d      Questions/Answers      Flowsheet Row Responses   Best Method for Contacting Cell   Currently Employed Yes   Able to keep appointments as scheduled Yes   Gender(s) and Name(s) female   Baby Active/Feeling Fetal Movemen Yes   How are you presently feeling? voices no complaints   Special Considerations Birthing Ball, Peanut Ball   Resource/Environmental Concerns None   Do you have any questions related to your care experience, your pregnancy, plans for delivery, any concerns, etc? No          Resource letter, spinning babies information, maternal warning signs sent to client in [employer] message.  In AVS prenatal education sent related to third trimester and keturah wang.     Nuris Cabrera RN  Maternity Nurse Navigator    8/22/2023, 13:53 CDT

## 2023-09-05 ENCOUNTER — ROUTINE PRENATAL (OUTPATIENT)
Dept: OBSTETRICS AND GYNECOLOGY | Facility: CLINIC | Age: 33
End: 2023-09-05

## 2023-09-05 VITALS — BODY MASS INDEX: 42.69 KG/M2 | DIASTOLIC BLOOD PRESSURE: 78 MMHG | WEIGHT: 241 LBS | SYSTOLIC BLOOD PRESSURE: 128 MMHG

## 2023-09-05 DIAGNOSIS — Z3A.30 30 WEEKS GESTATION OF PREGNANCY: Primary | ICD-10-CM

## 2023-09-05 DIAGNOSIS — O24.919 DIABETES MELLITUS AFFECTING PREGNANCY, ANTEPARTUM: ICD-10-CM

## 2023-09-05 DIAGNOSIS — Z87.891 FORMER SMOKER: ICD-10-CM

## 2023-09-05 DIAGNOSIS — Z34.03 PRIMIGRAVIDA, THIRD TRIMESTER: ICD-10-CM

## 2023-09-05 DIAGNOSIS — Z23 NEED FOR TDAP VACCINATION: ICD-10-CM

## 2023-09-05 PROBLEM — Z34.00 PRIMIGRAVIDA: Status: RESOLVED | Noted: 2023-09-05 | Resolved: 2023-09-05

## 2023-09-05 PROBLEM — Z34.00 PRIMIGRAVIDA: Status: ACTIVE | Noted: 2023-09-05

## 2023-09-05 LAB
GLUCOSE UR STRIP-MCNC: NEGATIVE MG/DL
PROT UR STRIP-MCNC: NEGATIVE MG/DL

## 2023-09-05 PROCEDURE — 90715 TDAP VACCINE 7 YRS/> IM: CPT | Performed by: ADVANCED PRACTICE MIDWIFE

## 2023-09-05 PROCEDURE — 90471 IMMUNIZATION ADMIN: CPT | Performed by: ADVANCED PRACTICE MIDWIFE

## 2023-09-05 PROCEDURE — 99213 OFFICE O/P EST LOW 20 MIN: CPT | Performed by: ADVANCED PRACTICE MIDWIFE

## 2023-09-05 NOTE — PROGRESS NOTES
Reason for visit: Routine OB visit at 30w2d     CC:  Patient reports good fetal movement. Denies VB, LOF, contractions, h/a, visual changes, and right upper quadrant pain.     ROS: All systems reviewed and are negative with exception of the following pelvic pain    Wt 241 lb for a TWG of 1.814 kg (4 lb), /78, FHTs 142  Urine today and reviewed: negative glucose, negative protein    28-week U/S: EFW 1449 g, 87%; AC 95%; VALERIE 14.9 cm; breech presentation; anterior placenta    Exam:  General Appearance:  Healthy appearing . Normal mood and behavior.  HEENT: NCAT, EOMI  HR str and reg. Lungs clear. Resp even and unlabored.  Abdomen is soft and nontender. Uterus is consistent with EGA  Ext: no edema, nontender, no trauma, or cyanosis.    Impression  Diagnoses and all orders for this visit:    1. 30 weeks gestation of pregnancy (Primary)  - Discussed and encouraged beginning to practice measures of support for relaxation during the birthing experience. She is planning to breastfeed and supplement with formula. Discussed and encouraged patient to consider  breastfeeding and childbirth education classes through the [facility]'s community education program. She is planning to call pediatricians in the [place] area to establish care for the baby after discharge from the hospital.   - She will complete her hemoglobin prior to leaving today. It has already been ordered.   -     POC Urinalysis Dipstick  -     Ambulatory Referral to [facility]/Perinatology    2. Primigravida, third trimester  - Discussed third trimester of pregnancy, discomforts and measures of support, fetal movement counts,  labor warnings, preeclampsia warnings, and signs and symptoms to report. Third Trimester of Pregnancy video included in the AVS.  - Reviewed glucose tolerance test and hemoglobin results with patient.  - Discussed and encouraged to come to the hospital or call with vaginal bleeding, leaking of fluid,  contractions, or other concerns.  - Return to the office in two weeks for routine OBV with Dr. Galvez with BPP and interval growth (unless growth completed through [facility]) for diabetes and as needed with concerns.  -     Tdap Vaccine => 6yo IM (BOOSTRIX)  -     Ambulatory Referral to [facility]/Perinatology    3. Diabetes mellitus affecting pregnancy, antepartum  - She had not been feeling well, so she has not been going to the gym and now feels tired/fatigued, which is also interfering with desire to go to the gym. Fasting blood sugars ranged from 129-167. Her breakfast blood sugars have been ranging from 128-190 and are usually at the 1 hour postprandial. She is not as consistent with timing for lunch and supper. Lunch postprandial sugars have been 153-180 and 160-202 postprandial blood sugars. Discussed with patient referral to [facility] for consultation with management options, including insulin.   -     Ambulatory Referral to [facility]/Perinatology    4. Need for Tdap vaccination  - Discussed Tdap immunization recommendations during pregnancy. Patient consents to receive the Tdap immunization during this clinic visit. Tdap (Tetanus, Diptheria, Pertussis) Vaccine: What You Need to Know (VIS) education included in the AVS.  -     Tdap Vaccine => 6yo IM (BOOSTRIX)    5. Former smoker          This note has been signed electronically.    Daya Sutton, DNP, APRN, CNM, RNC-OB

## 2023-09-06 LAB — HGB BLD-MCNC: 10.9 G/DL (ref 12–15.9)

## 2023-09-18 ENCOUNTER — TELEPHONE (OUTPATIENT)
Dept: OBSTETRICS AND GYNECOLOGY | Facility: CLINIC | Age: 33
End: 2023-09-18

## 2023-09-18 NOTE — TELEPHONE ENCOUNTER
Pt was in their office today for a visit and BPP, normally there on Thursdays but due to their office moving they will not be seeing pts that day. Patricia is asking if pt's scheduled appt with us tomorrow can be moved to Thursday instead.

## 2023-09-21 ENCOUNTER — ROUTINE PRENATAL (OUTPATIENT)
Dept: OBSTETRICS AND GYNECOLOGY | Facility: CLINIC | Age: 33
End: 2023-09-21
Payer: MEDICAID

## 2023-09-21 VITALS — SYSTOLIC BLOOD PRESSURE: 118 MMHG | DIASTOLIC BLOOD PRESSURE: 76 MMHG | WEIGHT: 239 LBS | BODY MASS INDEX: 42.34 KG/M2

## 2023-09-21 DIAGNOSIS — O24.919 DIABETES MELLITUS AFFECTING PREGNANCY, ANTEPARTUM: ICD-10-CM

## 2023-09-21 DIAGNOSIS — Z3A.32 32 WEEKS GESTATION OF PREGNANCY: Primary | ICD-10-CM

## 2023-09-21 LAB
GLUCOSE UR STRIP-MCNC: ABNORMAL MG/DL
PROT UR STRIP-MCNC: ABNORMAL MG/DL

## 2023-09-21 RX ORDER — INSULIN GLARGINE 100 [IU]/ML
INJECTION, SOLUTION SUBCUTANEOUS
COMMUNITY
Start: 2023-09-13

## 2023-09-21 RX ORDER — FLURBIPROFEN SODIUM 0.3 MG/ML
SOLUTION/ DROPS OPHTHALMIC
COMMUNITY
Start: 2023-09-13

## 2023-09-21 NOTE — PROGRESS NOTES
No complaints. Endorses appropriate fetal movement. Denies regular contractions, leakage of fluid, vaginal bleeding or discharge.    US today:  transverse lie, anterior placenta, VALERIE 12.7 cm (MVP 4.1 cm), BPP 8/8      Urine today:  Protein: 1+  Glucose: Trace    MFM next week. Titration of insulin dose then  C/w twice weekly testing    Diagnoses and all orders for this visit:    1. 32 weeks gestation of pregnancy (Primary)    2. Diabetes mellitus affecting pregnancy, antepartum

## 2023-09-25 ENCOUNTER — ROUTINE PRENATAL (OUTPATIENT)
Dept: OBSTETRICS AND GYNECOLOGY | Facility: CLINIC | Age: 33
End: 2023-09-25

## 2023-09-25 VITALS — BODY MASS INDEX: 42.34 KG/M2 | SYSTOLIC BLOOD PRESSURE: 130 MMHG | WEIGHT: 239 LBS | DIASTOLIC BLOOD PRESSURE: 68 MMHG

## 2023-09-25 DIAGNOSIS — Z3A.33 33 WEEKS GESTATION OF PREGNANCY: Primary | ICD-10-CM

## 2023-09-25 DIAGNOSIS — O36.60X0 EXCESSIVE FETAL GROWTH AFFECTING PREGNANCY, ANTEPARTUM, SINGLE OR UNSPECIFIED FETUS: ICD-10-CM

## 2023-09-25 DIAGNOSIS — Z34.03 PRIMIGRAVIDA, THIRD TRIMESTER: ICD-10-CM

## 2023-09-25 DIAGNOSIS — O24.919 DIABETES MELLITUS AFFECTING PREGNANCY, ANTEPARTUM: ICD-10-CM

## 2023-09-25 DIAGNOSIS — Z87.891 FORMER SMOKER: ICD-10-CM

## 2023-09-25 LAB
GLUCOSE UR STRIP-MCNC: NEGATIVE MG/DL
PROT UR STRIP-MCNC: NEGATIVE MG/DL

## 2023-09-25 PROCEDURE — 99213 OFFICE O/P EST LOW 20 MIN: CPT | Performed by: ADVANCED PRACTICE MIDWIFE

## 2023-09-25 NOTE — PROGRESS NOTES
Reason for visit: Routine OB visit at 33w1d     CC:  Patient reports good fetal movement. Denies VB, LOF, contractions, h/a, visual changes, and right upper quadrant pain.     ROS: All systems reviewed and are negative.    Wt 239 lb for a TWG of 0.907 kg (2 lb), /68, FHTs 144, FH 36 cm  Urine today and reviewed: negative glucose, negative protein    33-week U/S today: BPP 8/8 - reassuring; VALERIE 13.72 cm, DVP 5.62 cm; transverse lie; anterior placenta    31-week U/S: EFW 2436 g, >97%; AC >97%; VALERIE 16.61, DVP 5.77 cm; transverse - head to maternal right presentation; anterior placenta     Exam:  General Appearance:  Healthy appearing . Normal mood and behavior.  HEENT: NCAT, EOMI  HR str and reg. Lungs clear. Resp even and unlabored.  Abdomen is soft and nontender. Uterus is consistent with EGA  Ext: no edema, nontender, no trauma, or cyanosis.    Impression  Diagnoses and all orders for this visit:    1. 33 weeks gestation of pregnancy (Primary)  - They have signed up for childbirth education classes. She plans to both breastfeed and supplement with formula with her busy schedule. Discussed and encouraged practicing measures of support for relaxation during the birthing experience.  -     POC Urinalysis Dipstick    2. Primigravida, third trimester  - Discussed third trimester of pregnancy, discomforts and measures of support, fetal movement counts,  labor warnings, preeclampsia warnings, and signs and symptoms to report. Third Trimester of Pregnancy, Signs and Symptoms of Labor, and Alternative Pain Management During Labor and Delivery videos included in the AVS.  - Discussed and encouraged to come to the hospital or call with vaginal bleeding, leaking of fluid, contractions, or other concerns.  - Return to the office in one week with BPP with Dr. Galvez for routine OBV and as needed with concerns.    3. Diabetes mellitus affecting pregnancy, antepartum  - Blood sugars are improving but are still  elevated. She sees M on Thursday and there has been discussion about modifying plan with a second dose of insulin.    4. Excessive fetal growth affecting pregnancy, antepartum, single or unspecified fetus  - EFW and AC both greater than 97% at 31-weeks gestation. Plan for next interval growth at 35-weeks gestation.     5. Former smoker          This note has been signed electronically.    Daya Sutton, DNP, APRN, CNM, RNC-OB

## 2023-10-02 ENCOUNTER — ROUTINE PRENATAL (OUTPATIENT)
Dept: OBSTETRICS AND GYNECOLOGY | Facility: CLINIC | Age: 33
End: 2023-10-02
Payer: MEDICAID

## 2023-10-02 VITALS — BODY MASS INDEX: 42.51 KG/M2 | SYSTOLIC BLOOD PRESSURE: 104 MMHG | WEIGHT: 240 LBS | DIASTOLIC BLOOD PRESSURE: 60 MMHG

## 2023-10-02 DIAGNOSIS — O24.919 DIABETES MELLITUS AFFECTING PREGNANCY, ANTEPARTUM: Primary | ICD-10-CM

## 2023-10-02 RX ORDER — INSULIN GLARGINE 100 [IU]/ML
25 INJECTION, SOLUTION SUBCUTANEOUS DAILY
COMMUNITY
Start: 2023-09-28

## 2023-10-02 NOTE — PROGRESS NOTES
Good fetal movement  Labor precautions  FSBS under better control, MFM managing insulin  Has another growth  10/12  Offered flu vaccine  BPP today 8/8, VALERIE 14.3cm    Diagnoses and all orders for this visit:    1. Diabetes mellitus affecting pregnancy, antepartum (Primary)

## 2023-10-09 ENCOUNTER — ROUTINE PRENATAL (OUTPATIENT)
Dept: OBSTETRICS AND GYNECOLOGY | Facility: CLINIC | Age: 33
End: 2023-10-09

## 2023-10-09 VITALS — WEIGHT: 244 LBS | DIASTOLIC BLOOD PRESSURE: 80 MMHG | SYSTOLIC BLOOD PRESSURE: 118 MMHG | BODY MASS INDEX: 43.22 KG/M2

## 2023-10-09 DIAGNOSIS — O24.919 DIABETES MELLITUS AFFECTING PREGNANCY, ANTEPARTUM: Primary | ICD-10-CM

## 2023-10-09 PROCEDURE — 99213 OFFICE O/P EST LOW 20 MIN: CPT | Performed by: OBSTETRICS & GYNECOLOGY

## 2023-10-09 NOTE — PROGRESS NOTES
Good fetal movement  No contractions  US today BPP 8/, VALERIE 17.8cm  Has MFM US on Thursday to recheck growth  Possibly do cx's next visit pending growth measurements   labor precautions    Diagnoses and all orders for this visit:    1. Diabetes mellitus affecting pregnancy, antepartum (Primary)

## 2023-10-16 ENCOUNTER — PATIENT OUTREACH (OUTPATIENT)
Dept: LABOR AND DELIVERY | Facility: HOSPITAL | Age: 33
End: 2023-10-16
Payer: MEDICAID

## 2023-10-16 ENCOUNTER — ROUTINE PRENATAL (OUTPATIENT)
Dept: OBSTETRICS AND GYNECOLOGY | Facility: CLINIC | Age: 33
End: 2023-10-16
Payer: MEDICAID

## 2023-10-16 ENCOUNTER — TELEPHONE (OUTPATIENT)
Dept: OBSTETRICS AND GYNECOLOGY | Facility: CLINIC | Age: 33
End: 2023-10-16
Payer: MEDICAID

## 2023-10-16 VITALS — WEIGHT: 245 LBS | SYSTOLIC BLOOD PRESSURE: 118 MMHG | BODY MASS INDEX: 43.4 KG/M2 | DIASTOLIC BLOOD PRESSURE: 70 MMHG

## 2023-10-16 DIAGNOSIS — O24.919 DIABETES MELLITUS AFFECTING PREGNANCY, ANTEPARTUM: Primary | ICD-10-CM

## 2023-10-16 DIAGNOSIS — O36.63X0 FETAL MACROSOMIA DURING PREGNANCY IN THIRD TRIMESTER, SINGLE OR UNSPECIFIED FETUS: ICD-10-CM

## 2023-10-16 RX ORDER — INSULIN GLARGINE 100 [IU]/ML
35 INJECTION, SOLUTION SUBCUTANEOUS DAILY
COMMUNITY
Start: 2023-10-12

## 2023-10-16 NOTE — PROGRESS NOTES
Good fetal movement  Saw MFM last week, growth was >97% and AC ahead, projected to be >4500g by 39 weeks. Desires elective . Schedule on   GBS and GC/Chl ordered and done  Surg pack done  Labor instructions    Diagnoses and all orders for this visit:    1. Diabetes mellitus affecting pregnancy, antepartum (Primary)  -     Chlamydia trachomatis, Neisseria gonorrhoeae, PCR w/ confirmation - Swab, Vagina  -     Strep B Screen - Swab, Vaginal/Rectum    2. Fetal macrosomia during pregnancy in third trimester, single or unspecified fetus  -     Case Request

## 2023-10-16 NOTE — OUTREACH NOTE
[facility]    EPDS questionnaire sent to client in [facility] prior to our check-in this week.     Nuris Cabrera RN  Maternity Nurse Navigator    10/16/2023, 09:42 CDT

## 2023-10-16 NOTE — TELEPHONE ENCOUNTER
Pt notified of  scheduled for 23. Pt to arrive at 11:00am for a 1:00pm surgery. Pt notifed that at next appointment we will go over ERAS information. Pt understood.

## 2023-10-17 ENCOUNTER — PATIENT OUTREACH (OUTPATIENT)
Dept: LABOR AND DELIVERY | Facility: HOSPITAL | Age: 33
End: 2023-10-17
Payer: MEDICAID

## 2023-10-17 NOTE — OUTREACH NOTE
[facility]  Check-In    Current Estimated Gestational Age: 36w2d      Questions/Answers      Flowsheet Row Responses   Best Method for Contacting Cell   Currently Employed Yes   Able to keep appointments as scheduled Yes   Gender(s) and Name(s) female   Baby Active/Feeling Fetal Movemen Yes   How are you presently feeling? tired, pressure, some uterine contractions   Are you having any of the following symptoms? Pressure, Contractions, Other   Other Symptoms: fatigue   Supplies ready for baby Car Seat, Clothing, Crib, Diapers, Feeding Supplies   Resource/Environmental Concerns None   Do you have any questions related to your care experience, your pregnancy, plans for delivery, any concerns, etc? No          Resource letter sent to client in [email or web address].     Nuris Cabrera RN  Maternity Nurse Navigator    10/17/2023, 13:39 CDT

## 2023-10-18 ENCOUNTER — PREP FOR SURGERY (OUTPATIENT)
Dept: OTHER | Facility: HOSPITAL | Age: 33
End: 2023-10-18
Payer: MEDICAID

## 2023-10-18 DIAGNOSIS — Z3A.39 39 WEEKS GESTATION OF PREGNANCY: Primary | ICD-10-CM

## 2023-10-18 LAB
C TRACH RRNA SPEC QL NAA+PROBE: NEGATIVE
GP B STREP DNA SPEC QL NAA+PROBE: NEGATIVE
N GONORRHOEA RRNA SPEC QL NAA+PROBE: NEGATIVE

## 2023-10-18 RX ORDER — SODIUM CHLORIDE 9 MG/ML
40 INJECTION, SOLUTION INTRAVENOUS AS NEEDED
OUTPATIENT
Start: 2023-10-18

## 2023-10-18 RX ORDER — OXYTOCIN/0.9 % SODIUM CHLORIDE 30/500 ML
999 PLASTIC BAG, INJECTION (ML) INTRAVENOUS ONCE
OUTPATIENT
Start: 2023-10-18 | End: 2023-10-18

## 2023-10-18 RX ORDER — ONDANSETRON 4 MG/1
4 TABLET, FILM COATED ORAL EVERY 6 HOURS PRN
OUTPATIENT
Start: 2023-10-18

## 2023-10-18 RX ORDER — MISOPROSTOL 200 UG/1
800 TABLET ORAL ONCE AS NEEDED
OUTPATIENT
Start: 2023-10-18

## 2023-10-18 RX ORDER — FAMOTIDINE 10 MG/ML
20 INJECTION, SOLUTION INTRAVENOUS ONCE AS NEEDED
OUTPATIENT
Start: 2023-10-18

## 2023-10-18 RX ORDER — SODIUM CHLORIDE, SODIUM LACTATE, POTASSIUM CHLORIDE, CALCIUM CHLORIDE 600; 310; 30; 20 MG/100ML; MG/100ML; MG/100ML; MG/100ML
125 INJECTION, SOLUTION INTRAVENOUS CONTINUOUS
OUTPATIENT
Start: 2023-10-18

## 2023-10-18 RX ORDER — HYDROMORPHONE HYDROCHLORIDE 1 MG/ML
0.5 INJECTION, SOLUTION INTRAMUSCULAR; INTRAVENOUS; SUBCUTANEOUS
OUTPATIENT
Start: 2023-10-18 | End: 2023-10-28

## 2023-10-18 RX ORDER — ONDANSETRON 2 MG/ML
4 INJECTION INTRAMUSCULAR; INTRAVENOUS EVERY 6 HOURS PRN
OUTPATIENT
Start: 2023-10-18

## 2023-10-18 RX ORDER — OXYTOCIN/0.9 % SODIUM CHLORIDE 30/500 ML
250 PLASTIC BAG, INJECTION (ML) INTRAVENOUS CONTINUOUS
OUTPATIENT
Start: 2023-10-18 | End: 2023-10-18

## 2023-10-18 RX ORDER — METHYLERGONOVINE MALEATE 0.2 MG/ML
200 INJECTION INTRAVENOUS ONCE AS NEEDED
OUTPATIENT
Start: 2023-10-18

## 2023-10-18 RX ORDER — ACETAMINOPHEN 500 MG
1000 TABLET ORAL ONCE
OUTPATIENT
Start: 2023-10-18 | End: 2023-10-18

## 2023-10-18 RX ORDER — KETOROLAC TROMETHAMINE 30 MG/ML
30 INJECTION, SOLUTION INTRAMUSCULAR; INTRAVENOUS ONCE
OUTPATIENT
Start: 2023-10-18 | End: 2023-10-18

## 2023-10-18 RX ORDER — FAMOTIDINE 20 MG/1
20 TABLET, FILM COATED ORAL ONCE AS NEEDED
OUTPATIENT
Start: 2023-10-18

## 2023-10-18 RX ORDER — SODIUM CHLORIDE 0.9 % (FLUSH) 0.9 %
10 SYRINGE (ML) INJECTION EVERY 12 HOURS SCHEDULED
OUTPATIENT
Start: 2023-10-18

## 2023-10-18 RX ORDER — CARBOPROST TROMETHAMINE 250 UG/ML
250 INJECTION, SOLUTION INTRAMUSCULAR AS NEEDED
OUTPATIENT
Start: 2023-10-18

## 2023-10-18 RX ORDER — SODIUM CHLORIDE 0.9 % (FLUSH) 0.9 %
10 SYRINGE (ML) INJECTION AS NEEDED
OUTPATIENT
Start: 2023-10-18

## 2023-10-18 NOTE — H&P (VIEW-ONLY)
[facility]  Maritza Vazquez  : 1990  MRN: [id number]  CSN: [id number]    History and Physical    Subjective   Maritza Vazquez is a 33 y.o. year old  with an Estimated Date of Delivery: 23 scheduled on  for  delivery due to macrosomia.  She is not planning for sterilization at the time of the .    Prenatal care has been with Dr. Erickson Galvez.  It has been complicated by pre-gestational diabetes.    OB History    Para Term  AB Living   1 0 0 0 0 0   SAB IAB Ectopic Molar Multiple Live Births   0 0 0 0 0 0      # Outcome Date GA Lbr Talha/2nd Weight Sex Delivery Anes PTL Lv   1 Current              Past Medical History:   Diagnosis Date    Abnormal Pap smear of cervix 2007ish?    Depression 2009ish?    Have struggled with depression and anxiety for years    Goiter     Migraine     PCOS (polycystic ovarian syndrome)     Polycystic ovary syndrome      Past Surgical History:   Procedure Laterality Date    DILATATION AND CURETTAGE         Current Outpatient Medications:     albuterol (ACCUNEB) 1.25 MG/3ML nebulizer solution, , Disp: , Rfl:     B-D UF III MINI PEN NEEDLES 31G X 5 MM misc, , Disp: , Rfl:     ergocalciferol (ERGOCALCIFEROL) 1.25 MG (79265 UT) capsule, ergocalciferol (vitamin D2) 1,250 mcg (50,000 unit) capsule  1 capsule weekly, Disp: , Rfl:     Insulin Glargine (BASAGLAR KWIKPEN) 100 UNIT/ML injection pen, Inject 35 Units under the skin into the appropriate area as directed Daily., Disp: , Rfl:     Lancets misc, 1 each 4 (Four) Times a Day. Pt to test fasting as well as 1hr postprandial, total four times daily, Disp: 200 each, Rfl: 1    levothyroxine (SYNTHROID, LEVOTHROID) 50 MCG tablet, , Disp: , Rfl:     metFORMIN (GLUCOPHAGE) 500 MG tablet, Take 2 tablets by mouth 2 (Two) Times a Day With Meals., Disp: 120 tablet, Rfl: 3    propranolol (INDERAL) 20 MG tablet, Take 1 tablet by mouth Every Night., Disp: , Rfl:     sertraline (Zoloft) 25 MG tablet, Take 1  tablet by mouth Daily., Disp: 30 tablet, Rfl: 3    True Metrix Blood Glucose Test test strip, 200 each by Other route 4 (Four) Times a Day. Use as instructed, Disp: 150 each, Rfl: 5  Family History   Problem Relation Age of Onset    No Known Problems Father     No Known Problems Mother     No Known Problems Brother     Diabetes Paternal Grandfather     Colon cancer Maternal Grandfather     No Known Problems Brother     Breast cancer Neg Hx     Ovarian cancer Neg Hx        No Known Allergies  Social History    Tobacco Use      Smoking status: Former        Packs/day: .1        Types: Cigarettes, Electronic Cigarette      Smokeless tobacco: Never    Review of Systems      Objective   There were no vitals taken for this visit.  General: well developed; well nourished  no acute distress   Heart: regular rate and rhythm   Lungs: breathing is unlabored   Abdomen: soft, non-tender; no masses     Cervix:   presenting part vertex  Prenatal Labs  Lab Results   Component Value Date    HGB 10.9 (L) 2023    HEPBSAG Negative 2023    ABORH O Rh Positive 2016    ABO O 2023    RH Positive 2023    ABSCRN Negative 2023    MND8PXC8 Non Reactive 2023    HEPCVIRUSABY Non Reactive 2023    URINECX Final report 2023       Recent Labs  Lab Results   Component Value Date    HGB 10.9 (L) 2023    HCT 39.2 2023    WBC 9.26 2023     2023           Assessment   IUP with an Estimated Date of Delivery: 23  Planned  section on  for macrosomia.     Plan   Primary     Risks, benefits, and alternatives to  section were discussed with the patient at  length.  The surgical nature of  section was discussed.  The indications for  section were discussed.  Risks of bleeding, infection, and damage to surrounding organs were reviewed.  Injury to blood vessels, the urinary bladder, the ureter, and the intestines were all  reviewed.  Management of these complications were reviewed.    All of the patient's questions were answered to her satisfaction.  She verbalized understanding.  She wished to proceed.      Abner Galvez MD  10/18/2023

## 2023-10-18 NOTE — H&P
[facility]  Maritza Vazquez  : 1990  MRN: [id number]  CSN: [id number]    History and Physical    Subjective   Maritza Vazquez is a 33 y.o. year old  with an Estimated Date of Delivery: 23 scheduled on  for  delivery due to macrosomia.  She is not planning for sterilization at the time of the .    Prenatal care has been with Dr. Erickson Galvez.  It has been complicated by pre-gestational diabetes.    OB History    Para Term  AB Living   1 0 0 0 0 0   SAB IAB Ectopic Molar Multiple Live Births   0 0 0 0 0 0      # Outcome Date GA Lbr Talha/2nd Weight Sex Delivery Anes PTL Lv   1 Current              Past Medical History:   Diagnosis Date    Abnormal Pap smear of cervix 2007ish?    Depression 2009ish?    Have struggled with depression and anxiety for years    Goiter     Migraine     PCOS (polycystic ovarian syndrome)     Polycystic ovary syndrome      Past Surgical History:   Procedure Laterality Date    DILATATION AND CURETTAGE         Current Outpatient Medications:     albuterol (ACCUNEB) 1.25 MG/3ML nebulizer solution, , Disp: , Rfl:     B-D UF III MINI PEN NEEDLES 31G X 5 MM misc, , Disp: , Rfl:     ergocalciferol (ERGOCALCIFEROL) 1.25 MG (57997 UT) capsule, ergocalciferol (vitamin D2) 1,250 mcg (50,000 unit) capsule  1 capsule weekly, Disp: , Rfl:     Insulin Glargine (BASAGLAR KWIKPEN) 100 UNIT/ML injection pen, Inject 35 Units under the skin into the appropriate area as directed Daily., Disp: , Rfl:     Lancets misc, 1 each 4 (Four) Times a Day. Pt to test fasting as well as 1hr postprandial, total four times daily, Disp: 200 each, Rfl: 1    levothyroxine (SYNTHROID, LEVOTHROID) 50 MCG tablet, , Disp: , Rfl:     metFORMIN (GLUCOPHAGE) 500 MG tablet, Take 2 tablets by mouth 2 (Two) Times a Day With Meals., Disp: 120 tablet, Rfl: 3    propranolol (INDERAL) 20 MG tablet, Take 1 tablet by mouth Every Night., Disp: , Rfl:     sertraline (Zoloft) 25 MG tablet, Take 1  tablet by mouth Daily., Disp: 30 tablet, Rfl: 3    True Metrix Blood Glucose Test test strip, 200 each by Other route 4 (Four) Times a Day. Use as instructed, Disp: 150 each, Rfl: 5  Family History   Problem Relation Age of Onset    No Known Problems Father     No Known Problems Mother     No Known Problems Brother     Diabetes Paternal Grandfather     Colon cancer Maternal Grandfather     No Known Problems Brother     Breast cancer Neg Hx     Ovarian cancer Neg Hx        No Known Allergies  Social History    Tobacco Use      Smoking status: Former        Packs/day: .1        Types: Cigarettes, Electronic Cigarette      Smokeless tobacco: Never    Review of Systems      Objective   There were no vitals taken for this visit.  General: well developed; well nourished  no acute distress   Heart: regular rate and rhythm   Lungs: breathing is unlabored   Abdomen: soft, non-tender; no masses     Cervix:   presenting part vertex  Prenatal Labs  Lab Results   Component Value Date    HGB 10.9 (L) 2023    HEPBSAG Negative 2023    ABORH O Rh Positive 2016    ABO O 2023    RH Positive 2023    ABSCRN Negative 2023    MGW8HLD8 Non Reactive 2023    HEPCVIRUSABY Non Reactive 2023    URINECX Final report 2023       Recent Labs  Lab Results   Component Value Date    HGB 10.9 (L) 2023    HCT 39.2 2023    WBC 9.26 2023     2023           Assessment   IUP with an Estimated Date of Delivery: 23  Planned  section on  for macrosomia.     Plan   Primary     Risks, benefits, and alternatives to  section were discussed with the patient at  length.  The surgical nature of  section was discussed.  The indications for  section were discussed.  Risks of bleeding, infection, and damage to surrounding organs were reviewed.  Injury to blood vessels, the urinary bladder, the ureter, and the intestines were all  reviewed.  Management of these complications were reviewed.    All of the patient's questions were answered to her satisfaction.  She verbalized understanding.  She wished to proceed.      Abner Galvez MD  10/18/2023

## 2023-10-23 ENCOUNTER — ROUTINE PRENATAL (OUTPATIENT)
Dept: OBSTETRICS AND GYNECOLOGY | Facility: CLINIC | Age: 33
End: 2023-10-23

## 2023-10-23 VITALS — SYSTOLIC BLOOD PRESSURE: 104 MMHG | WEIGHT: 245 LBS | BODY MASS INDEX: 43.4 KG/M2 | DIASTOLIC BLOOD PRESSURE: 68 MMHG

## 2023-10-23 DIAGNOSIS — O24.919 DIABETES MELLITUS AFFECTING PREGNANCY, ANTEPARTUM: Primary | ICD-10-CM

## 2023-10-23 DIAGNOSIS — O36.63X0 FETAL MACROSOMIA DURING PREGNANCY IN THIRD TRIMESTER, SINGLE OR UNSPECIFIED FETUS: ICD-10-CM

## 2023-10-23 PROCEDURE — 99213 OFFICE O/P EST LOW 20 MIN: CPT | Performed by: OBSTETRICS & GYNECOLOGY

## 2023-10-23 NOTE — PROGRESS NOTES
Good fetal movement  No contractions  US today BPP 8/8, VALERIE 14cm, transverse lie  Reviewed GBS negative  Labor instructions,  in 2 weeks    Diagnoses and all orders for this visit:    1. Diabetes mellitus affecting pregnancy, antepartum (Primary)    2. Fetal macrosomia during pregnancy in third trimester, single or unspecified fetus

## 2023-10-30 ENCOUNTER — ROUTINE PRENATAL (OUTPATIENT)
Dept: OBSTETRICS AND GYNECOLOGY | Facility: CLINIC | Age: 33
End: 2023-10-30

## 2023-10-30 VITALS — WEIGHT: 244 LBS | SYSTOLIC BLOOD PRESSURE: 108 MMHG | BODY MASS INDEX: 43.22 KG/M2 | DIASTOLIC BLOOD PRESSURE: 84 MMHG

## 2023-10-30 DIAGNOSIS — O24.919 DIABETES MELLITUS AFFECTING PREGNANCY, ANTEPARTUM: Primary | ICD-10-CM

## 2023-10-30 DIAGNOSIS — O36.63X0 FETAL MACROSOMIA DURING PREGNANCY IN THIRD TRIMESTER, SINGLE OR UNSPECIFIED FETUS: ICD-10-CM

## 2023-10-30 NOTE — PROGRESS NOTES
Good fetal movement  No contractions  Has MFM appointment in 3 days  US today BPP 8/8, VALERIE 19cm, transverse back up  Labor instructions, deliver in 1 week    Diagnoses and all orders for this visit:    1. Diabetes mellitus affecting pregnancy, antepartum (Primary)    2. Fetal macrosomia during pregnancy in third trimester, single or unspecified fetus

## 2023-11-03 ENCOUNTER — TELEPHONE (OUTPATIENT)
Dept: OBSTETRICS AND GYNECOLOGY | Facility: CLINIC | Age: 33
End: 2023-11-03

## 2023-11-03 NOTE — TELEPHONE ENCOUNTER
Pt informed by phone that c/s on 11/6/23 has been moved up to 745a. Pt to arrive at [facility] at 530a. Voiced understanding. Pt requesting ERAS info be sent via my chart message.

## 2023-11-06 ENCOUNTER — ANESTHESIA EVENT (OUTPATIENT)
Dept: LABOR AND DELIVERY | Facility: HOSPITAL | Age: 33
End: 2023-11-06

## 2023-11-06 ENCOUNTER — HOSPITAL ENCOUNTER (INPATIENT)
Facility: HOSPITAL | Age: 33
LOS: 3 days | Discharge: HOME | End: 2023-11-09
Attending: OBSTETRICS & GYNECOLOGY | Admitting: OBSTETRICS & GYNECOLOGY

## 2023-11-06 ENCOUNTER — ANESTHESIA (OUTPATIENT)
Dept: LABOR AND DELIVERY | Facility: HOSPITAL | Age: 33
End: 2023-11-06

## 2023-11-06 DIAGNOSIS — G89.18 PAIN FOLLOWING CESAREAN DELIVERY: Primary | ICD-10-CM

## 2023-11-06 DIAGNOSIS — Z3A.39 39 WEEKS GESTATION OF PREGNANCY: ICD-10-CM

## 2023-11-06 PROBLEM — Z34.00 PRIMIGRAVIDA: Status: RESOLVED | Noted: 2023-05-30 | Resolved: 2023-11-06

## 2023-11-06 PROBLEM — O32.2XX0: Status: ACTIVE | Noted: 2023-11-06

## 2023-11-06 LAB
ABO GROUP BLD: NORMAL
BLD GP AB SCN SERPL QL: NEGATIVE
DEPRECATED RDW RBC AUTO: 42.2 FL (ref 37–54)
ERYTHROCYTE [DISTWIDTH] IN BLOOD BY AUTOMATED COUNT: 13.5 % (ref 12.3–15.4)
GLUCOSE BLDC GLUCOMTR-MCNC: 101 MG/DL (ref 70–130)
GLUCOSE BLDC GLUCOMTR-MCNC: 119 MG/DL (ref 70–130)
GLUCOSE BLDC GLUCOMTR-MCNC: 120 MG/DL (ref 70–130)
HCT VFR BLD AUTO: 33.7 % (ref 34–46.6)
HGB BLD-MCNC: 10.7 G/DL (ref 12–15.9)
MCH RBC QN AUTO: 27 PG (ref 26.6–33)
MCHC RBC AUTO-ENTMCNC: 31.8 G/DL (ref 31.5–35.7)
MCV RBC AUTO: 85.1 FL (ref 79–97)
PLATELET # BLD AUTO: 215 10*3/MM3 (ref 140–450)
PMV BLD AUTO: 10.1 FL (ref 6–12)
RBC # BLD AUTO: 3.96 10*6/MM3 (ref 3.77–5.28)
RH BLD: POSITIVE
T&S EXPIRATION DATE: NORMAL
WBC NRBC COR # BLD: 7.86 10*3/MM3 (ref 3.4–10.8)

## 2023-11-06 PROCEDURE — 25810000003 LACTATED RINGERS SOLUTION: Performed by: OBSTETRICS & GYNECOLOGY

## 2023-11-06 PROCEDURE — 59515 CESAREAN DELIVERY: CPT | Performed by: OBSTETRICS & GYNECOLOGY

## 2023-11-06 PROCEDURE — 25010000002 KETOROLAC TROMETHAMINE PER 15 MG: Performed by: OBSTETRICS & GYNECOLOGY

## 2023-11-06 PROCEDURE — 86900 BLOOD TYPING SEROLOGIC ABO: CPT | Performed by: OBSTETRICS & GYNECOLOGY

## 2023-11-06 PROCEDURE — 25010000002 BUPIVACAINE PF 0.75 % SOLUTION: Performed by: NURSE ANESTHETIST, CERTIFIED REGISTERED

## 2023-11-06 PROCEDURE — 25010000002 HYDROMORPHONE 1 MG/ML SOLUTION: Performed by: NURSE ANESTHETIST, CERTIFIED REGISTERED

## 2023-11-06 PROCEDURE — 82948 REAGENT STRIP/BLOOD GLUCOSE: CPT

## 2023-11-06 PROCEDURE — 25010000002 KETOROLAC TROMETHAMINE PER 15 MG: Performed by: NURSE ANESTHETIST, CERTIFIED REGISTERED

## 2023-11-06 PROCEDURE — 63710000001 INSULIN DETEMIR PER 5 UNITS: Performed by: OBSTETRICS & GYNECOLOGY

## 2023-11-06 PROCEDURE — 25010000002 CEFAZOLIN PER 500 MG: Performed by: OBSTETRICS & GYNECOLOGY

## 2023-11-06 PROCEDURE — 85027 COMPLETE CBC AUTOMATED: CPT | Performed by: OBSTETRICS & GYNECOLOGY

## 2023-11-06 PROCEDURE — 25010000002 OXYTOCIN PER 10 UNITS: Performed by: NURSE ANESTHETIST, CERTIFIED REGISTERED

## 2023-11-06 PROCEDURE — 25010000002 NALBUPHINE PER 10 MG: Performed by: NURSE ANESTHETIST, CERTIFIED REGISTERED

## 2023-11-06 PROCEDURE — 25810000003 LACTATED RINGERS PER 1000 ML: Performed by: OBSTETRICS & GYNECOLOGY

## 2023-11-06 PROCEDURE — 25010000002 ONDANSETRON PER 1 MG: Performed by: NURSE ANESTHETIST, CERTIFIED REGISTERED

## 2023-11-06 PROCEDURE — 51702 INSERT TEMP BLADDER CATH: CPT

## 2023-11-06 PROCEDURE — 86901 BLOOD TYPING SEROLOGIC RH(D): CPT | Performed by: OBSTETRICS & GYNECOLOGY

## 2023-11-06 PROCEDURE — 25010000002 METOCLOPRAMIDE PER 10 MG: Performed by: NURSE ANESTHETIST, CERTIFIED REGISTERED

## 2023-11-06 PROCEDURE — 86850 RBC ANTIBODY SCREEN: CPT | Performed by: OBSTETRICS & GYNECOLOGY

## 2023-11-06 RX ORDER — OXYTOCIN/0.9 % SODIUM CHLORIDE 30/500 ML
999 PLASTIC BAG, INJECTION (ML) INTRAVENOUS ONCE
Status: DISCONTINUED | OUTPATIENT
Start: 2023-11-06 | End: 2023-11-06 | Stop reason: HOSPADM

## 2023-11-06 RX ORDER — ENOXAPARIN SODIUM 100 MG/ML
40 INJECTION SUBCUTANEOUS NIGHTLY
Status: DISCONTINUED | OUTPATIENT
Start: 2023-11-07 | End: 2023-11-07

## 2023-11-06 RX ORDER — SODIUM CHLORIDE 0.9 % (FLUSH) 0.9 %
10 SYRINGE (ML) INJECTION EVERY 12 HOURS SCHEDULED
Status: DISCONTINUED | OUTPATIENT
Start: 2023-11-06 | End: 2023-11-06 | Stop reason: HOSPADM

## 2023-11-06 RX ORDER — DOCUSATE SODIUM 100 MG/1
100 CAPSULE, LIQUID FILLED ORAL 2 TIMES DAILY PRN
Status: DISCONTINUED | OUTPATIENT
Start: 2023-11-06 | End: 2023-11-09 | Stop reason: HOSPADM

## 2023-11-06 RX ORDER — CITRIC ACID/SODIUM CITRATE 334-500MG
15 SOLUTION, ORAL ORAL ONCE
Status: COMPLETED | OUTPATIENT
Start: 2023-11-06 | End: 2023-11-06

## 2023-11-06 RX ORDER — DROPERIDOL 2.5 MG/ML
0.62 INJECTION, SOLUTION INTRAMUSCULAR; INTRAVENOUS ONCE AS NEEDED
Status: DISCONTINUED | OUTPATIENT
Start: 2023-11-06 | End: 2023-11-09 | Stop reason: HOSPADM

## 2023-11-06 RX ORDER — KETOROLAC TROMETHAMINE 30 MG/ML
30 INJECTION, SOLUTION INTRAMUSCULAR; INTRAVENOUS ONCE
Status: DISCONTINUED | OUTPATIENT
Start: 2023-11-06 | End: 2023-11-06 | Stop reason: HOSPADM

## 2023-11-06 RX ORDER — KETOROLAC TROMETHAMINE 30 MG/ML
INJECTION, SOLUTION INTRAMUSCULAR; INTRAVENOUS AS NEEDED
Status: DISCONTINUED | OUTPATIENT
Start: 2023-11-06 | End: 2023-11-06 | Stop reason: SURG

## 2023-11-06 RX ORDER — SODIUM CHLORIDE 9 MG/ML
40 INJECTION, SOLUTION INTRAVENOUS AS NEEDED
Status: DISCONTINUED | OUTPATIENT
Start: 2023-11-06 | End: 2023-11-06 | Stop reason: HOSPADM

## 2023-11-06 RX ORDER — BUPIVACAINE HCL/0.9 % NACL/PF 0.125 %
PLASTIC BAG, INJECTION (ML) EPIDURAL AS NEEDED
Status: DISCONTINUED | OUTPATIENT
Start: 2023-11-06 | End: 2023-11-06 | Stop reason: SURG

## 2023-11-06 RX ORDER — SODIUM CHLORIDE, SODIUM LACTATE, POTASSIUM CHLORIDE, CALCIUM CHLORIDE 600; 310; 30; 20 MG/100ML; MG/100ML; MG/100ML; MG/100ML
125 INJECTION, SOLUTION INTRAVENOUS CONTINUOUS
Status: DISCONTINUED | OUTPATIENT
Start: 2023-11-06 | End: 2023-11-06

## 2023-11-06 RX ORDER — HYDROMORPHONE HYDROCHLORIDE 1 MG/ML
0.5 INJECTION, SOLUTION INTRAMUSCULAR; INTRAVENOUS; SUBCUTANEOUS
Status: DISCONTINUED | OUTPATIENT
Start: 2023-11-06 | End: 2023-11-06 | Stop reason: HOSPADM

## 2023-11-06 RX ORDER — KETOROLAC TROMETHAMINE 15 MG/ML
15 INJECTION, SOLUTION INTRAMUSCULAR; INTRAVENOUS EVERY 6 HOURS
Status: COMPLETED | OUTPATIENT
Start: 2023-11-06 | End: 2023-11-07

## 2023-11-06 RX ORDER — METOCLOPRAMIDE HYDROCHLORIDE 5 MG/ML
10 INJECTION INTRAMUSCULAR; INTRAVENOUS ONCE
Status: COMPLETED | OUTPATIENT
Start: 2023-11-06 | End: 2023-11-06

## 2023-11-06 RX ORDER — FAMOTIDINE 10 MG/ML
20 INJECTION, SOLUTION INTRAVENOUS ONCE AS NEEDED
Status: DISCONTINUED | OUTPATIENT
Start: 2023-11-06 | End: 2023-11-06 | Stop reason: HOSPADM

## 2023-11-06 RX ORDER — ACETAMINOPHEN 500 MG
1000 TABLET ORAL EVERY 6 HOURS
Status: COMPLETED | OUTPATIENT
Start: 2023-11-06 | End: 2023-11-07

## 2023-11-06 RX ORDER — ONDANSETRON 2 MG/ML
4 INJECTION INTRAMUSCULAR; INTRAVENOUS ONCE AS NEEDED
Status: COMPLETED | OUTPATIENT
Start: 2023-11-06 | End: 2023-11-06

## 2023-11-06 RX ORDER — PROPRANOLOL HYDROCHLORIDE 40 MG/1
20 TABLET ORAL NIGHTLY
Status: DISCONTINUED | OUTPATIENT
Start: 2023-11-06 | End: 2023-11-09 | Stop reason: HOSPADM

## 2023-11-06 RX ORDER — EPHEDRINE SULFATE 50 MG/ML
INJECTION INTRAVENOUS AS NEEDED
Status: DISCONTINUED | OUTPATIENT
Start: 2023-11-06 | End: 2023-11-06 | Stop reason: SURG

## 2023-11-06 RX ORDER — METHYLERGONOVINE MALEATE 0.2 MG/ML
200 INJECTION INTRAVENOUS ONCE AS NEEDED
Status: DISCONTINUED | OUTPATIENT
Start: 2023-11-06 | End: 2023-11-06 | Stop reason: HOSPADM

## 2023-11-06 RX ORDER — ONDANSETRON 2 MG/ML
4 INJECTION INTRAMUSCULAR; INTRAVENOUS EVERY 6 HOURS PRN
Status: DISCONTINUED | OUTPATIENT
Start: 2023-11-06 | End: 2023-11-09 | Stop reason: HOSPADM

## 2023-11-06 RX ORDER — OXYTOCIN 10 [USP'U]/ML
INJECTION, SOLUTION INTRAMUSCULAR; INTRAVENOUS AS NEEDED
Status: DISCONTINUED | OUTPATIENT
Start: 2023-11-06 | End: 2023-11-06 | Stop reason: SURG

## 2023-11-06 RX ORDER — PRENATAL VIT/IRON FUM/FOLIC AC 27MG-0.8MG
1 TABLET ORAL DAILY
Status: DISCONTINUED | OUTPATIENT
Start: 2023-11-06 | End: 2023-11-09 | Stop reason: HOSPADM

## 2023-11-06 RX ORDER — CARBOPROST TROMETHAMINE 250 UG/ML
250 INJECTION, SOLUTION INTRAMUSCULAR ONCE AS NEEDED
Status: DISCONTINUED | OUTPATIENT
Start: 2023-11-06 | End: 2023-11-09 | Stop reason: HOSPADM

## 2023-11-06 RX ORDER — MISOPROSTOL 200 UG/1
800 TABLET ORAL ONCE AS NEEDED
Status: DISCONTINUED | OUTPATIENT
Start: 2023-11-06 | End: 2023-11-06 | Stop reason: HOSPADM

## 2023-11-06 RX ORDER — METHYLERGONOVINE MALEATE 0.2 MG/ML
200 INJECTION INTRAVENOUS AS NEEDED
Status: DISCONTINUED | OUTPATIENT
Start: 2023-11-06 | End: 2023-11-09 | Stop reason: HOSPADM

## 2023-11-06 RX ORDER — PRENATAL VIT NO.126/IRON/FOLIC 28MG-0.8MG
1 TABLET ORAL DAILY
COMMUNITY

## 2023-11-06 RX ORDER — OXYTOCIN/0.9 % SODIUM CHLORIDE 30/500 ML
125 PLASTIC BAG, INJECTION (ML) INTRAVENOUS CONTINUOUS PRN
Status: DISCONTINUED | OUTPATIENT
Start: 2023-11-06 | End: 2023-11-09 | Stop reason: HOSPADM

## 2023-11-06 RX ORDER — OXYCODONE HYDROCHLORIDE 5 MG/1
10 TABLET ORAL EVERY 4 HOURS PRN
Status: DISCONTINUED | OUTPATIENT
Start: 2023-11-06 | End: 2023-11-09 | Stop reason: HOSPADM

## 2023-11-06 RX ORDER — SODIUM CHLORIDE, SODIUM LACTATE, POTASSIUM CHLORIDE, CALCIUM CHLORIDE 600; 310; 30; 20 MG/100ML; MG/100ML; MG/100ML; MG/100ML
125 INJECTION, SOLUTION INTRAVENOUS CONTINUOUS
Status: DISCONTINUED | OUTPATIENT
Start: 2023-11-06 | End: 2023-11-09 | Stop reason: HOSPADM

## 2023-11-06 RX ORDER — SIMETHICONE 80 MG
80 TABLET,CHEWABLE ORAL 4 TIMES DAILY PRN
Status: DISCONTINUED | OUTPATIENT
Start: 2023-11-06 | End: 2023-11-09 | Stop reason: HOSPADM

## 2023-11-06 RX ORDER — IBUPROFEN 600 MG/1
600 TABLET ORAL EVERY 6 HOURS
Status: DISCONTINUED | OUTPATIENT
Start: 2023-11-07 | End: 2023-11-09 | Stop reason: HOSPADM

## 2023-11-06 RX ORDER — FAMOTIDINE 20 MG/1
20 TABLET, FILM COATED ORAL ONCE AS NEEDED
Status: DISCONTINUED | OUTPATIENT
Start: 2023-11-06 | End: 2023-11-06 | Stop reason: HOSPADM

## 2023-11-06 RX ORDER — OXYCODONE HYDROCHLORIDE 5 MG/1
5 TABLET ORAL EVERY 4 HOURS PRN
Status: DISCONTINUED | OUTPATIENT
Start: 2023-11-06 | End: 2023-11-09 | Stop reason: HOSPADM

## 2023-11-06 RX ORDER — ONDANSETRON 4 MG/1
4 TABLET, FILM COATED ORAL EVERY 6 HOURS PRN
Status: DISCONTINUED | OUTPATIENT
Start: 2023-11-06 | End: 2023-11-06 | Stop reason: HOSPADM

## 2023-11-06 RX ORDER — SODIUM CHLORIDE 0.9 % (FLUSH) 0.9 %
10 SYRINGE (ML) INJECTION AS NEEDED
Status: DISCONTINUED | OUTPATIENT
Start: 2023-11-06 | End: 2023-11-06 | Stop reason: HOSPADM

## 2023-11-06 RX ORDER — MISOPROSTOL 200 UG/1
800 TABLET ORAL ONCE AS NEEDED
Status: DISCONTINUED | OUTPATIENT
Start: 2023-11-06 | End: 2023-11-09 | Stop reason: HOSPADM

## 2023-11-06 RX ORDER — ONDANSETRON 2 MG/ML
4 INJECTION INTRAMUSCULAR; INTRAVENOUS EVERY 6 HOURS PRN
Status: DISCONTINUED | OUTPATIENT
Start: 2023-11-06 | End: 2023-11-06 | Stop reason: HOSPADM

## 2023-11-06 RX ORDER — ONDANSETRON 4 MG/1
4 TABLET, FILM COATED ORAL EVERY 8 HOURS PRN
Status: DISCONTINUED | OUTPATIENT
Start: 2023-11-06 | End: 2023-11-09 | Stop reason: HOSPADM

## 2023-11-06 RX ORDER — FAMOTIDINE 10 MG/ML
20 INJECTION, SOLUTION INTRAVENOUS ONCE AS NEEDED
Status: COMPLETED | OUTPATIENT
Start: 2023-11-06 | End: 2023-11-06

## 2023-11-06 RX ORDER — CARBOPROST TROMETHAMINE 250 UG/ML
250 INJECTION, SOLUTION INTRAMUSCULAR AS NEEDED
Status: DISCONTINUED | OUTPATIENT
Start: 2023-11-06 | End: 2023-11-06 | Stop reason: HOSPADM

## 2023-11-06 RX ORDER — NALOXONE HCL 0.4 MG/ML
0.1 VIAL (ML) INJECTION
Status: DISCONTINUED | OUTPATIENT
Start: 2023-11-06 | End: 2023-11-09 | Stop reason: HOSPADM

## 2023-11-06 RX ORDER — HYDROMORPHONE HYDROCHLORIDE 1 MG/ML
0.5 INJECTION, SOLUTION INTRAMUSCULAR; INTRAVENOUS; SUBCUTANEOUS
Status: DISCONTINUED | OUTPATIENT
Start: 2023-11-06 | End: 2023-11-09 | Stop reason: HOSPADM

## 2023-11-06 RX ORDER — ACETAMINOPHEN 325 MG/1
650 TABLET ORAL EVERY 6 HOURS
Status: DISCONTINUED | OUTPATIENT
Start: 2023-11-07 | End: 2023-11-09 | Stop reason: HOSPADM

## 2023-11-06 RX ORDER — LEVOTHYROXINE SODIUM 0.05 MG/1
50 TABLET ORAL
Status: DISCONTINUED | OUTPATIENT
Start: 2023-11-06 | End: 2023-11-09 | Stop reason: HOSPADM

## 2023-11-06 RX ORDER — NALBUPHINE HYDROCHLORIDE 10 MG/ML
2.5 INJECTION, SOLUTION INTRAMUSCULAR; INTRAVENOUS; SUBCUTANEOUS EVERY 4 HOURS PRN
Status: DISPENSED | OUTPATIENT
Start: 2023-11-06 | End: 2023-11-07

## 2023-11-06 RX ORDER — ACETAMINOPHEN 500 MG
1000 TABLET ORAL ONCE
Status: COMPLETED | OUTPATIENT
Start: 2023-11-06 | End: 2023-11-06

## 2023-11-06 RX ORDER — BUPIVACAINE HYDROCHLORIDE 7.5 MG/ML
INJECTION, SOLUTION EPIDURAL; RETROBULBAR AS NEEDED
Status: DISCONTINUED | OUTPATIENT
Start: 2023-11-06 | End: 2023-11-06 | Stop reason: SURG

## 2023-11-06 RX ORDER — NALOXONE HCL 0.4 MG/ML
0.4 VIAL (ML) INJECTION
Status: ACTIVE | OUTPATIENT
Start: 2023-11-06 | End: 2023-11-07

## 2023-11-06 RX ORDER — OXYTOCIN/0.9 % SODIUM CHLORIDE 30/500 ML
250 PLASTIC BAG, INJECTION (ML) INTRAVENOUS CONTINUOUS
Status: ACTIVE | OUTPATIENT
Start: 2023-11-06 | End: 2023-11-06

## 2023-11-06 RX ADMIN — ONDANSETRON 4 MG: 2 INJECTION INTRAMUSCULAR; INTRAVENOUS at 07:38

## 2023-11-06 RX ADMIN — KETOROLAC TROMETHAMINE 30 MG: 30 INJECTION, SOLUTION INTRAMUSCULAR; INTRAVENOUS at 08:09

## 2023-11-06 RX ADMIN — BUPIVACAINE HYDROCHLORIDE 1.5 MG: 7.5 INJECTION, SOLUTION EPIDURAL; RETROBULBAR at 07:44

## 2023-11-06 RX ADMIN — ONDANSETRON 4 MG: 2 INJECTION INTRAMUSCULAR; INTRAVENOUS at 13:15

## 2023-11-06 RX ADMIN — OXYTOCIN 20 UNITS: 10 INJECTION INTRAVENOUS at 08:02

## 2023-11-06 RX ADMIN — FAMOTIDINE 20 MG: 10 INJECTION INTRAVENOUS at 07:36

## 2023-11-06 RX ADMIN — METOCLOPRAMIDE HYDROCHLORIDE 10 MG: 5 INJECTION INTRAMUSCULAR; INTRAVENOUS at 07:36

## 2023-11-06 RX ADMIN — KETOROLAC TROMETHAMINE 15 MG: 15 INJECTION, SOLUTION INTRAMUSCULAR; INTRAVENOUS at 16:11

## 2023-11-06 RX ADMIN — HYDROMORPHONE HYDROCHLORIDE 0.1 MG: 1 INJECTION, SOLUTION INTRAMUSCULAR; INTRAVENOUS; SUBCUTANEOUS at 07:44

## 2023-11-06 RX ADMIN — SODIUM CHLORIDE, POTASSIUM CHLORIDE, SODIUM LACTATE AND CALCIUM CHLORIDE 1000 ML: 600; 310; 30; 20 INJECTION, SOLUTION INTRAVENOUS at 07:36

## 2023-11-06 RX ADMIN — SODIUM CHLORIDE, POTASSIUM CHLORIDE, SODIUM LACTATE AND CALCIUM CHLORIDE: 600; 310; 30; 20 INJECTION, SOLUTION INTRAVENOUS at 07:51

## 2023-11-06 RX ADMIN — ACETAMINOPHEN 1000 MG: 500 TABLET, FILM COATED ORAL at 18:37

## 2023-11-06 RX ADMIN — METFORMIN HCL 1000 MG: 500 TABLET ORAL at 18:08

## 2023-11-06 RX ADMIN — SODIUM CHLORIDE, POTASSIUM CHLORIDE, SODIUM LACTATE AND CALCIUM CHLORIDE 125 ML/HR: 600; 310; 30; 20 INJECTION, SOLUTION INTRAVENOUS at 18:08

## 2023-11-06 RX ADMIN — Medication 100 MCG: at 07:49

## 2023-11-06 RX ADMIN — SODIUM CHLORIDE, POTASSIUM CHLORIDE, SODIUM LACTATE AND CALCIUM CHLORIDE 125 ML/HR: 600; 310; 30; 20 INJECTION, SOLUTION INTRAVENOUS at 06:46

## 2023-11-06 RX ADMIN — KETOROLAC TROMETHAMINE 15 MG: 15 INJECTION, SOLUTION INTRAMUSCULAR; INTRAVENOUS at 22:03

## 2023-11-06 RX ADMIN — EPHEDRINE SULFATE 15 MG: 50 INJECTION INTRAVENOUS at 07:58

## 2023-11-06 RX ADMIN — ACETAMINOPHEN 1000 MG: 500 TABLET, FILM COATED ORAL at 05:51

## 2023-11-06 RX ADMIN — INSULIN DETEMIR 15 UNITS: 100 INJECTION, SOLUTION SUBCUTANEOUS at 22:02

## 2023-11-06 RX ADMIN — OXYTOCIN 20 UNITS: 10 INJECTION INTRAVENOUS at 08:28

## 2023-11-06 RX ADMIN — Medication 200 MCG: at 08:16

## 2023-11-06 RX ADMIN — SODIUM CITRATE AND CITRIC ACID MONOHYDRATE 15 ML: 500; 334 SOLUTION ORAL at 07:36

## 2023-11-06 RX ADMIN — ACETAMINOPHEN 1000 MG: 500 TABLET, FILM COATED ORAL at 13:15

## 2023-11-06 RX ADMIN — NALBUPHINE HYDROCHLORIDE 2.5 MG: 10 INJECTION, SOLUTION INTRAMUSCULAR; INTRAVENOUS; SUBCUTANEOUS at 22:46

## 2023-11-06 RX ADMIN — CEFAZOLIN 2 G: 2 INJECTION, POWDER, FOR SOLUTION INTRAMUSCULAR; INTRAVENOUS at 07:36

## 2023-11-06 RX ADMIN — SODIUM CHLORIDE, POTASSIUM CHLORIDE, SODIUM LACTATE AND CALCIUM CHLORIDE: 600; 310; 30; 20 INJECTION, SOLUTION INTRAVENOUS at 08:28

## 2023-11-06 RX ADMIN — Medication 200 MCG: at 07:58

## 2023-11-06 RX ADMIN — Medication 100 MCG: at 07:52

## 2023-11-06 NOTE — OP NOTE
[facility]  Maritza Vazquez  : 1990  MRN: [id number]  [facility]: [id number]  Date: 2023    Operative Note        Pre-op Diagnosis:  Fetal macrosomia during pregnancy in third trimester, single or unspecified fetus [O36.63X0]  Pregnancy at 39 weeks  Malpresentation, oblique lie  Maternal diabetes   Post-op Diagnosis:  Post-Op Diagnosis Codes:     * Fetal macrosomia during pregnancy in third trimester, single or unspecified fetus [O36.63X0]  Pregnancy at 39 weeks  Malpresentation, oblique lie  Maternal diabetes   Procedure: Procedure(s):   SECTION PRIMARY   Surgeon: Surgeon(s):  Abner Galvez MD       Anesthesia: Spinal     Estimated Blood Loss: 700   mLs   Fluids: 2000   mLs   UOP: 300   mLs   Drains: Demarco catheter   ABx: Kefzol     Specimens:  None   Findings: Normal uterus, tubes, and ovaries.    Complications: None       INDICATION: Maritza Vazquez is a 33 y.o. female who presents at 39 weeks for a scheduled  due to malpresentation and suspected macrosomia in the setting of maternal diabetes in pregnancy.     PROCEDURE: After informed consent was obtained, the patient was taken to the operating room where spinal anesthesia was administered. Time out procedure was completed and perioperative antibiotics were administered. She was placed in the supine position with leftward tilt and her abdomen was prepped and draped in normal sterile fashion after a Demarco catheter was placed by nursing staff.   After confirming adequate anesthesia, a Pfannenstiel incision was made with a scalpel 2 fingerbreadths above the pubic symphysis.  This was carried down sharply to the underlying fascia which was incised in the midline.  The fascial incision was extended laterally with Hernandez scissors.  The fascial edges were then elevated and the underlying rectus muscles dissected off with sharp technique.  The rectus muscles were sharply  in the midline and the underlying peritoneum identified and  entered sharply.  The peritoneal incision was then extended and an Chip-O retractor inserted, taking care to avoid entrapping the bowel.  A low transverse uterine incision was made with a clean scalpel.  The uterine incision was bluntly extended and amniotomy was performed returning clear fluid.  The head of the infant was moved into vertex presentation from left oblique lie, and then delivered through the incision without difficulty and the remainder of the infant delivered with fundal pressure.  The infant was vigorous on the field, the cord was clamped and cut, and the infant handed off to waiting nursery nurse.  Cord blood was obtained and the placenta then expressed.  The uterus was repaired in situ and cleared of clot and debris with a moist laparotomy sponge.  The uterine incision was closed with a running locked suture of 0 Monocryl.  A second imbricating layer of 0 Monocryl was placed for reinforcement.  The uterine incision was hemostatic.  The retractor was removed.  The parietal peritoneum was then closed with a running suture of 2-0 Vicryl Rapide.  The subfascial spaces and rectus muscles were inspected with hemostasis noted.  The fascia was then closed with a running suture of 0 Vicryl.  The subcutaneous tissues were irrigated and made hemostatic with Bovie cautery.  The subcutaneous tissues were reapproximated with interrupted sutures of 2-0 plain gut.  The skin was closed with a subcuticular stitch of 3-0 Vicryl Rapide and reinforced with Steri-Strips.  A sterile dressing was then applied.    After expressing the uterus the patient was transitioned to the stretcher and taken to the recovery room in stable condition.  She tolerated the procedure well without complications.  All sponge, needle, and instrument counts were correct ×3 per the OR staff.    Abner Galvez MD   11/6/2023  08:45 CST

## 2023-11-06 NOTE — ANESTHESIA PREPROCEDURE EVALUATION
" Anesthesia Evaluation     no history of anesthetic complications:   NPO Solid Status: > 8 hours  NPO Liquid Status: > 8 hours           Airway   Mallampati: II  TM distance: >3 FB  Neck ROM: full  Large neck circumference  Dental - normal exam     Pulmonary    Cardiovascular   Exercise tolerance: good (4-7 METS)        Neuro/Psych  (+) psychiatric history Depression  GI/Hepatic/Renal/Endo    (+) morbid obesity, GERD, diabetes mellitus gestational, thyroid problem hypothyroidism    Musculoskeletal     (+) back painGait problem: Sees a chiropractor for degenerative disc disease and receives \"numbing shots\".  Abdominal    Substance History      OB/GYN          Other                    Anesthesia Plan    ASA 3     spinal       Anesthetic plan, risks, benefits, and alternatives have been provided, discussed and informed consent has been obtained with: patient.    CODE STATUS:    Code Status (Patient has no pulse and is not breathing): CPR (Attempt to Resuscitate)  Medical Interventions (Patient has pulse or is breathing): Full Support      "

## 2023-11-06 NOTE — ANESTHESIA PROCEDURE NOTES
Spinal Block      Start Time: 11/6/2023 7:39 AM  Stop Time: 11/6/2023 7:46 AM  Indication:procedure for pain  Preanesthetic Checklist  Completed: patient identified, IV checked, site marked, risks and benefits discussed, surgical consent, monitors and equipment checked, pre-op evaluation and timeout performed  Spinal Block Prep:  Sterile Tech:cap, gloves and sterile barriers  Patient Monitoring:EKG, continuous pulse oximetry and blood pressure monitoring    Spinal Block Procedure  Approach:midline  Guidance:landmark technique and palpation technique  Location:L3-L4  Needle Type:Sprotte  Needle Gauge:24 G  Placement of Spinal needle event:cerebrospinal fluid aspirated  Paresthesia: no  Fluid Appearance:clear     Post Assessment  Patient Tolerance:patient tolerated the procedure well with no apparent complications  Complications no

## 2023-11-06 NOTE — INTERVAL H&P NOTE
H&P updated. The patient was examined and fetal lie is oblique with head on maternal left, by my bedside US.

## 2023-11-06 NOTE — PLAN OF CARE
Goal Outcome Evaluation:  Plan of Care Reviewed With: patient, spouse        Progress: improving  Outcome Evaluation: pt presents for scheuled primary  section; ERAS complete

## 2023-11-06 NOTE — PLAN OF CARE
Goal Outcome Evaluation:  Plan of Care Reviewed With: patient, spouse        Progress: improving  Outcome Evaluation: VSS; FF/ML/UU with scant lochia, output good, incision with pressure dressing dry and intact, some nausea since to floor- zofran given, order blood sugars fasting and after meals, one after lunch 101; pt states metformin and levemir for blood sugar, also takes inderal at bedtime, pt starts lovenox tomorrow, no other pain medication given, patient bonding well with infant

## 2023-11-06 NOTE — LACTATION NOTE
"Mother's Name: Maritza Phone #: [phone]  Infant Name: Danielle  : 23  Gestation: 39w1d  Day of life:   Birth weight: 10-4 (4650g) Discharge weight:  Weight Loss:   24 hour Summary of Feeds: Formula X 1 (30 ml)  Voids:  Stools:  Assistive devices (shields, shells, etc):  Significant Maternal history: , GDM, PCOS, Depression, Migraines  Maternal Concerns:    Maternal Goal: Wants to try to breastfeed but fine with formula feeding. Will not breastfeed if it becomes stressful.   Mother's Medications: PNV, Ergocalciferol, Insuline Glargine, Propanolol, Synthroid, Zoloft, Metformin  Breastpump for home: Needs Rx  Ped follow up appt: [facility]    Infant transitioning in NICU at this time. Infant's BG check was low and required interventions. Infant was given glucogel and fed 30 ml of formula. Patient verbalized plan to \"try\" to breastfeed. States she is wanting to both breast and formula feed and is okay if breastfeeding is unsuccessful. She does not want to breastfeed if it becomes stressful. Listened and affirmed patient of plan. Lactation support offered. Discussed the need to begin stimulating to build supply with pumping if infant remains in NICU. Explained the sooner breastfeeding/pumping is initiated, the better. Gave initial breastfeeding packet. Placed belt phone number on communication board. Recommended patient call for assistance when ready. Patient verbalized understanding. Questions denied.    1415  Informed by RN that infant is now on 2A with patient. RN requested lactation assist with first feeding. Patient reports she latched infant prior to visit and that infant fed well. She states she also fed infant formula. Several family members in the room at time of visit. Discussed the need to pump when supplementing with formula. Offered to set up pump and assist. Patient to call when ready.     1720  RN requested pump be set up for patient use, as patient would rather pump than latch infant " for now. Assembled pump in room. Education provided regarding use of pump, cleaning of pump parts, flange size, and expected amounts collected with pumping at this time. Offered to assist. Patient declines pumping at this time. Family in room visiting.     Instructed mom our lactation team is here for continued support throughout their breastfeeding journey. Our team has encouraged mom to call with any questions or concerns that may arise after discharge.    Breastfeeding and Diaper Chart  Check List for Essentials of Positioning And Latch-on handout provided by Lactation Education Resources  Hand Expression handout provided by Lactation Education Resources  Five Keys to Successful Breastfeeding handout provided by Lactation Education Resources    The Many Benefits if Breastfeeding handout given  Breastfeeding saves time  *Breastfeeding allows you to calm or feed your baby immediately, which leads to a happier baby who cries less  *There is nothing to buy, prepare, or maintain.There is nothing to clean or sterilize.  Breastfeeding builds a mothers confidence  *She knows all her baby needs to thrive is her!  Breastfeeding saves Money  *There is no formula to buy and healthier breast fed babies have less medical costs  Healthy Mom/Healthy baby  * babies get sick less often, and when they do they are usually sick less severely and for a shorter time  * babies have fewer ear infections  * babies have fewer allergies  *Mothers who breastfeed have a lower risk for cancer, osteoporosis, anemia, high blood pressure, obesity, and Type ll diabetes  *Mothers miss less work days with sick babies  Breast fed babies have a better dental health  * babies have better jaw development which requires lest orthodontic work  *Breast milk does not promote cavities  * babies can nurse at night without worry of tooth decay  Breastfeeding allows a baby to reach his full IQ potential  *The longer  a baby is breast fed, the better their brain development  Breast fed babies and moms are more relaxed  *The hormones released during breastfeeding have a calming effect on mothers  *Breastfeeding requires mom to take a break; this may help mom get more rest after delivery  *Breastfeeding is quicker than preparing formula which allows mom and baby to get back to sleep faster  *Breastfeeding promotes bonding and allows mom to learn babies cues and care needs more quickly  Breastfeeding cleanup is easier  *The bowel movements and spit up of breast fed babies doesn't smell as bad  *Spit-up of breast fed babies doesn't stain clothing  Getting out of the hourse is easier  *No formula bottles to prepare and carry safely   *No time restraints due to worry about what baby will eat  *No worries about warming a bottle or finding safe water to prepare bottles  Breastfeeding mother get their bodies back sooner  *The uterus shrinks more quickly and completely, which allows a flatter tummy  *Breastfeeding burns 400-500 calories a day; making milk torches stored fat!  Breastfeeding is better for the environment  *There is no trash to dispose of after breastfeeding  *There is no production facility to produce breast milk; moms body does it all without the pollution of a factory      Your Guide to Breastfeeding Booklet by [employer]dd[employer], www.Supponor      Safe Storage of Breastmilk magnet: PredictAd.DataRank    Educational Breastfeeding Videos on   YouTube  (length of video in minutes)    Expressing the First Milk - Small Baby Series (7:19)  Hand Expression [facility] (7:34)  Attaching Your Baby at the Breast - Breastfeeding Series (10:26)  The Power of Pumping - [facility]   Maximizing Production [facility] (9:35)  Instructions for use Medela Symphony breastpump (English) (1:58)  Medela 2-Phase Expression (4:05)  Medela double pumping video (2:19)  Choosing your PersonalFit  breast shield size (3:04)  We also recommend visiting [email or web address].MomentCam for valuable education and videos on breastfeeding full term AND  infants. This is a great resource to begin learning about breastfeeding during pregnancy as well.                [facility]             [phone]

## 2023-11-07 LAB
BASOPHILS # BLD AUTO: 0.04 10*3/MM3 (ref 0–0.2)
BASOPHILS NFR BLD AUTO: 0.4 % (ref 0–1.5)
DEPRECATED RDW RBC AUTO: 42.8 FL (ref 37–54)
EOSINOPHIL # BLD AUTO: 0.12 10*3/MM3 (ref 0–0.4)
EOSINOPHIL NFR BLD AUTO: 1.3 % (ref 0.3–6.2)
ERYTHROCYTE [DISTWIDTH] IN BLOOD BY AUTOMATED COUNT: 13.6 % (ref 12.3–15.4)
GLUCOSE BLDC GLUCOMTR-MCNC: 103 MG/DL (ref 70–130)
GLUCOSE BLDC GLUCOMTR-MCNC: 112 MG/DL (ref 70–130)
GLUCOSE BLDC GLUCOMTR-MCNC: 122 MG/DL (ref 70–130)
GLUCOSE BLDC GLUCOMTR-MCNC: 163 MG/DL (ref 70–130)
HCT VFR BLD AUTO: 29.9 % (ref 34–46.6)
HGB BLD-MCNC: 9.4 G/DL (ref 12–15.9)
IMM GRANULOCYTES # BLD AUTO: 0.03 10*3/MM3 (ref 0–0.05)
IMM GRANULOCYTES NFR BLD AUTO: 0.3 % (ref 0–0.5)
LYMPHOCYTES # BLD AUTO: 1.79 10*3/MM3 (ref 0.7–3.1)
LYMPHOCYTES NFR BLD AUTO: 20 % (ref 19.6–45.3)
MCH RBC QN AUTO: 27.2 PG (ref 26.6–33)
MCHC RBC AUTO-ENTMCNC: 31.4 G/DL (ref 31.5–35.7)
MCV RBC AUTO: 86.7 FL (ref 79–97)
MONOCYTES # BLD AUTO: 0.41 10*3/MM3 (ref 0.1–0.9)
MONOCYTES NFR BLD AUTO: 4.6 % (ref 5–12)
NEUTROPHILS NFR BLD AUTO: 6.54 10*3/MM3 (ref 1.7–7)
NEUTROPHILS NFR BLD AUTO: 73.4 % (ref 42.7–76)
NRBC BLD AUTO-RTO: 0 /100 WBC (ref 0–0.2)
PLATELET # BLD AUTO: 205 10*3/MM3 (ref 140–450)
PMV BLD AUTO: 9.7 FL (ref 6–12)
RBC # BLD AUTO: 3.45 10*6/MM3 (ref 3.77–5.28)
WBC NRBC COR # BLD: 8.93 10*3/MM3 (ref 3.4–10.8)

## 2023-11-07 PROCEDURE — 25810000003 LACTATED RINGERS PER 1000 ML: Performed by: OBSTETRICS & GYNECOLOGY

## 2023-11-07 PROCEDURE — 63710000001 INSULIN DETEMIR PER 5 UNITS: Performed by: OBSTETRICS & GYNECOLOGY

## 2023-11-07 PROCEDURE — 85025 COMPLETE CBC W/AUTO DIFF WBC: CPT | Performed by: OBSTETRICS & GYNECOLOGY

## 2023-11-07 PROCEDURE — 25010000002 ENOXAPARIN PER 10 MG: Performed by: OBSTETRICS & GYNECOLOGY

## 2023-11-07 PROCEDURE — 82948 REAGENT STRIP/BLOOD GLUCOSE: CPT

## 2023-11-07 PROCEDURE — 25010000002 KETOROLAC TROMETHAMINE PER 15 MG: Performed by: OBSTETRICS & GYNECOLOGY

## 2023-11-07 RX ORDER — ENOXAPARIN SODIUM 100 MG/ML
40 INJECTION SUBCUTANEOUS DAILY
Status: DISCONTINUED | OUTPATIENT
Start: 2023-11-08 | End: 2023-11-09 | Stop reason: HOSPADM

## 2023-11-07 RX ADMIN — PRENATAL VIT W/ FE FUMARATE-FA TAB 27-0.8 MG 1 TABLET: 27-0.8 TAB at 09:03

## 2023-11-07 RX ADMIN — ENOXAPARIN SODIUM 40 MG: 100 INJECTION SUBCUTANEOUS at 09:03

## 2023-11-07 RX ADMIN — INSULIN DETEMIR 15 UNITS: 100 INJECTION, SOLUTION SUBCUTANEOUS at 20:51

## 2023-11-07 RX ADMIN — METFORMIN HCL 1000 MG: 500 TABLET ORAL at 09:02

## 2023-11-07 RX ADMIN — KETOROLAC TROMETHAMINE 15 MG: 15 INJECTION, SOLUTION INTRAMUSCULAR; INTRAVENOUS at 03:37

## 2023-11-07 RX ADMIN — PROPRANOLOL HYDROCHLORIDE 20 MG: 40 TABLET ORAL at 20:51

## 2023-11-07 RX ADMIN — LEVOTHYROXINE SODIUM 50 MCG: 50 TABLET ORAL at 06:31

## 2023-11-07 RX ADMIN — SODIUM CHLORIDE, POTASSIUM CHLORIDE, SODIUM LACTATE AND CALCIUM CHLORIDE 125 ML/HR: 600; 310; 30; 20 INJECTION, SOLUTION INTRAVENOUS at 00:29

## 2023-11-07 RX ADMIN — IBUPROFEN 600 MG: 600 TABLET, FILM COATED ORAL at 15:16

## 2023-11-07 RX ADMIN — METFORMIN HCL 1000 MG: 500 TABLET ORAL at 18:12

## 2023-11-07 RX ADMIN — ACETAMINOPHEN 1000 MG: 500 TABLET, FILM COATED ORAL at 06:31

## 2023-11-07 RX ADMIN — IBUPROFEN 600 MG: 600 TABLET, FILM COATED ORAL at 20:51

## 2023-11-07 RX ADMIN — ACETAMINOPHEN 650 MG: 325 TABLET, FILM COATED ORAL at 11:51

## 2023-11-07 RX ADMIN — OXYCODONE HYDROCHLORIDE 5 MG: 5 TABLET ORAL at 23:20

## 2023-11-07 RX ADMIN — KETOROLAC TROMETHAMINE 15 MG: 15 INJECTION, SOLUTION INTRAMUSCULAR; INTRAVENOUS at 09:03

## 2023-11-07 RX ADMIN — ACETAMINOPHEN 1000 MG: 500 TABLET, FILM COATED ORAL at 00:29

## 2023-11-07 RX ADMIN — ACETAMINOPHEN 650 MG: 325 TABLET, FILM COATED ORAL at 17:41

## 2023-11-07 NOTE — LACTATION NOTE
"Mother's Name: Maritza Phone #: [phone]  Infant Name: Danielle  : 23  Gestation: 39w1d  Day of life: 1  Birth weight: 10-4 (4650g) Discharge weight:  Weight Loss: -1.29%  24 hour Summary of Feeds: 2 bf attempts + 2 ml ebm + formula X 6 (225 ml)  Voids: 6  Stools: 5  Assistive devices (shields, shells, etc):  Significant Maternal history: , GDM, PCOS, Depression, Migraines  Maternal Concerns: None   Maternal Goal: Wants to try to breastfeed but fine with formula feeding. Will not breastfeed if it becomes stressful.   Mother's Medications: PNV, Ergocalciferol, Insuline Glargine, Propanolol, Synthroid, Zoloft, Metformin  Breastpump for home: Declined Rx.  Ped follow up appt: [facility]    Patient reports pumping and feeding infant what she collects. States she mixes the colostrum with the formula. When asked if she would like breastfeeding assistance today, she states, \"I just wants to have a good day\". States yesterday was rough. Listened and acknowledged patient's wishes. Informed lactation staff would be here until 7 this evening. Recommended not mixing colostrum with formula, feed them separately. Offered support throughout the day. Questions denied.     Instructed mom our lactation team is here for continued support throughout their breastfeeding journey. Our team has encouraged mom to call with any questions or concerns that may arise after discharge.            "

## 2023-11-07 NOTE — PLAN OF CARE
Goal Outcome Evaluation:              Outcome Evaluation: VSS.  Fundus and lochia WDL.  Pt reports flatus.  Pain at level of tolerance with ERAs protocol.  Voiding and ambulating without difficulty. Blood glucose checks completed 2 hours after meals this shift. Pt is attempting to pump and is formula feeding.

## 2023-11-07 NOTE — PLAN OF CARE
Goal Outcome Evaluation:  Plan of Care Reviewed With: patient        Progress: improving  Outcome Evaluation: VSS, FFMLU1, scant rubra, hathaway out DTV, passing some gas, ambulating, pain controlled with ERAS protocol, fasting and 2 hour after meal blood sugar checks

## 2023-11-07 NOTE — PROGRESS NOTES
[facility]  [facility] [place] [facility]  [address]  [place] [address]  Office [phone]  Fax [phone]      [facility]   PROGRESS NOTE    Post-Op Day 1 S/P   Subjective     Patient reports:  Pain is controlled with  ERAS protocol. She is less than 24 hours post anesthesia. She has not yet used oxycodone .  She is ambulating. Tolerating diet. Voiding -  due to void status post hathaway catheter removal earlier this morning ; flatus reported..  Vaginal bleeding is as much as expected.    She is pumping for expressed breast milk.      Objective      Vitals: Vital Signs Range for the last 24 hours  Temperature: Temp:  [97 °F (36.1 °C)-98.1 °F (36.7 °C)] 97.8 °F (36.6 °C)   Temp Source: Temp src: Oral   BP: BP: (106-134)/(57-83) 117/71   Pulse: Heart Rate:  [75-97] 97   Respirations: Resp:  [14-18] 16   SPO2: SpO2:  [96 %-100 %] 98 %   O2 Amount (l/min):     O2 Devices Device (Oxygen Therapy): room air   Weight:              Physical Exam    Lungs Respirations even and unlabored   Abdomen Soft. Fundus firm, non-tender.   Incision  Dressing clean, dry and intact.   Extremities edema trace and extremities atraumatic, no cyanosis     I reviewed the patient's new clinical results.  Lab Results (last 24 hours)       Procedure Component Value Units Date/Time    POC Glucose Once [[id number]]  (Normal) Collected: 23    Specimen: Blood Updated: 23     Glucose 103 mg/dL      Comment: : [id number] Luisa HeatherMeter ID: [id number]       CBC & Differential [[id number]]  (Abnormal) Collected: 23    Specimen: Blood Updated: 23    Narrative:      The following orders were created for panel order CBC & Differential.  Procedure                               Abnormality         Status                     ---------                               -----------         ------                     CBC Auto Differential[[id number]]        Abnormal            Final  result                 Please view results for these tests on the individual orders.    CBC Auto Differential [[id number]]  (Abnormal) Collected: 11/07/23 0602    Specimen: Blood Updated: 11/07/23 0616     WBC 8.93 10*3/mm3      RBC 3.45 10*6/mm3      Hemoglobin 9.4 g/dL      Hematocrit 29.9 %      MCV 86.7 fL      MCH 27.2 pg      MCHC 31.4 g/dL      RDW 13.6 %      RDW-SD 42.8 fl      MPV 9.7 fL      Platelets 205 10*3/mm3      Neutrophil % 73.4 %      Lymphocyte % 20.0 %      Monocyte % 4.6 %      Eosinophil % 1.3 %      Basophil % 0.4 %      Immature Grans % 0.3 %      Neutrophils, Absolute 6.54 10*3/mm3      Lymphocytes, Absolute 1.79 10*3/mm3      Monocytes, Absolute 0.41 10*3/mm3      Eosinophils, Absolute 0.12 10*3/mm3      Basophils, Absolute 0.04 10*3/mm3      Immature Grans, Absolute 0.03 10*3/mm3      nRBC 0.0 /100 WBC     POC Glucose Once [[id number]]  (Normal) Collected: 11/06/23 2201    Specimen: Blood Updated: 11/06/23 2212     Glucose 120 mg/dL      Comment: : [id number] Luisa HeatherMeter ID: [id number]       POC Glucose Once [[id number]]  (Normal) Collected: 11/06/23 1610    Specimen: Blood Updated: 11/06/23 1630     Glucose 101 mg/dL      Comment: : [id number] Dlylan AshtonMeter ID: [id number]               External Prenatal Results       Pregnancy Outside Results - Transcribed From Office Records - See Scanned Records For Details       Test Value Date Time    ABO  O  11/06/23 0552    Rh  Positive  11/06/23 0552    Antibody Screen  Negative  11/06/23 0552       Negative  03/29/23 1124    Varicella IgG       Rubella  2.68 index 03/29/23 1124    Hgb  9.4 g/dL 11/07/23 0602       10.7 g/dL 11/06/23 0552       10.9 g/dL 09/05/23 0835       12.8 g/dL 03/29/23 1124    Hct  29.9 % 11/07/23 0602       33.7 % 11/06/23 0552       39.2 % 03/29/23 1124    Glucose Fasting GTT       Glucose Tolerance Test 1 hour       Glucose Tolerance Test 3 hour       Gonorrhea (discrete)  Negative  10/16/23 1500        Negative  23 1124    Chlamydia (discrete)  Negative  10/16/23 1500       Negative  23 1124    RPR  Non Reactive  23 1124    VDRL       Syphilis Antibody       HBsAg  Negative  23 1124    Herpes Simplex Virus PCR       Herpes Simplex VIrus Culture       HIV  Non Reactive  23 1124    Hep C RNA Quant PCR       Hep C Antibody  Non Reactive  23 1124    AFP       Group B Strep  Negative  10/16/23 1500    GBS Susceptibility to Clindamycin       GBS Susceptibility to Erythromycin       Fetal Fibronectin       Genetic Testing, Maternal Blood                 Drug Screening       Test Value Date Time    Urine Drug Screen       Amphetamine Screen       Barbiturate Screen       Benzodiazepine Screen       Methadone Screen       Phencyclidine Screen       Opiates Screen       THC Screen       Cocaine Screen       Propoxyphene Screen       Buprenorphine Screen       Methamphetamine Screen       Oxycodone Screen       Tricyclic Antidepressants Screen                 Legend    ^: Historical                            Assessment & Plan        Fetal macrosomia in pregnancy in third trimester    39 weeks gestation of pregnancy    Oblique fetal presentation, antepartum      Assessment:    Maritza Vazquez is Day 1  post-partum  , Low Transverse   .       Plan:  Remove dressing. Continue current postoperative and postpartum plan of care. Lactation support as needed.        This note has been signed electronically.    Daya Sutton, DNP, APRN, CNM, RNC-OB  2023  07:28 CST

## 2023-11-07 NOTE — ANESTHESIA POSTPROCEDURE EVALUATION
Patient: Maritza Vazquez    Procedure Summary       Date: 23 Room / Location: [facility] LABOR DELIVERY  /  [facility] LABOR DELIVERY    Anesthesia Start: 738 Anesthesia Stop: 837    Procedure:  SECTION PRIMARY (Abdomen) Diagnosis:       Fetal macrosomia during pregnancy in third trimester, single or unspecified fetus      (Fetal macrosomia during pregnancy in third trimester, single or unspecified fetus [O36.63X0])    Surgeons: Abner Galvez MD Provider: Nilda Murphy CRNA    Anesthesia Type: spinal ASA Status: 3            Anesthesia Type: spinal    Vitals  Vitals Value Taken Time   /83 23 1323   Temp 97.6 °F (36.4 °C) 23 1323   Pulse 93 23 1323   Resp 18 23 1323   SpO2 98 % 23 1323           Post Anesthesia Care and Evaluation    Patient location during evaluation: floor  Patient participation: complete - patient participated  Level of consciousness: awake and alert  Pain management: satisfactory to patient  Anesthetic complications: No anesthetic complications  PONV Status: none  Cardiovascular status: acceptable  Respiratory status: acceptable  Post Neuraxial Block status: Motor and sensory function returned to baseline and No signs or symptoms of PDPH  Comments: /83 (BP Location: Right arm, Patient Position: Sitting)   Pulse 93   Temp 97.6 °F (36.4 °C) (Oral)   Resp 18   SpO2 98%   Breastfeeding Yes

## 2023-11-08 ENCOUNTER — PATIENT OUTREACH (OUTPATIENT)
Dept: LABOR AND DELIVERY | Facility: HOSPITAL | Age: 33
End: 2023-11-08

## 2023-11-08 LAB
GLUCOSE BLDC GLUCOMTR-MCNC: 107 MG/DL (ref 70–130)
GLUCOSE BLDC GLUCOMTR-MCNC: 125 MG/DL (ref 70–130)
GLUCOSE BLDC GLUCOMTR-MCNC: 126 MG/DL (ref 70–130)
GLUCOSE BLDC GLUCOMTR-MCNC: 157 MG/DL (ref 70–130)

## 2023-11-08 PROCEDURE — 63710000001 INSULIN DETEMIR PER 5 UNITS: Performed by: OBSTETRICS & GYNECOLOGY

## 2023-11-08 PROCEDURE — 82948 REAGENT STRIP/BLOOD GLUCOSE: CPT

## 2023-11-08 PROCEDURE — 25010000002 ENOXAPARIN PER 10 MG: Performed by: NURSE PRACTITIONER

## 2023-11-08 RX ORDER — TRAMADOL HYDROCHLORIDE 50 MG/1
50 TABLET ORAL EVERY 6 HOURS PRN
Status: DISCONTINUED | OUTPATIENT
Start: 2023-11-08 | End: 2023-11-09 | Stop reason: HOSPADM

## 2023-11-08 RX ADMIN — IBUPROFEN 600 MG: 600 TABLET, FILM COATED ORAL at 15:10

## 2023-11-08 RX ADMIN — IBUPROFEN 600 MG: 600 TABLET, FILM COATED ORAL at 09:34

## 2023-11-08 RX ADMIN — PRENATAL VIT W/ FE FUMARATE-FA TAB 27-0.8 MG 1 TABLET: 27-0.8 TAB at 09:33

## 2023-11-08 RX ADMIN — TRAMADOL HYDROCHLORIDE 50 MG: 50 TABLET, COATED ORAL at 09:34

## 2023-11-08 RX ADMIN — ACETAMINOPHEN 650 MG: 325 TABLET, FILM COATED ORAL at 18:12

## 2023-11-08 RX ADMIN — ACETAMINOPHEN 650 MG: 325 TABLET, FILM COATED ORAL at 06:12

## 2023-11-08 RX ADMIN — IBUPROFEN 600 MG: 600 TABLET, FILM COATED ORAL at 04:14

## 2023-11-08 RX ADMIN — IBUPROFEN 600 MG: 600 TABLET, FILM COATED ORAL at 20:58

## 2023-11-08 RX ADMIN — METFORMIN HCL 1000 MG: 500 TABLET ORAL at 09:33

## 2023-11-08 RX ADMIN — ENOXAPARIN SODIUM 40 MG: 100 INJECTION SUBCUTANEOUS at 09:34

## 2023-11-08 RX ADMIN — PROPRANOLOL HYDROCHLORIDE 20 MG: 40 TABLET ORAL at 20:58

## 2023-11-08 RX ADMIN — INSULIN DETEMIR 15 UNITS: 100 INJECTION, SOLUTION SUBCUTANEOUS at 20:58

## 2023-11-08 RX ADMIN — TRAMADOL HYDROCHLORIDE 50 MG: 50 TABLET, COATED ORAL at 20:58

## 2023-11-08 RX ADMIN — LEVOTHYROXINE SODIUM 50 MCG: 50 TABLET ORAL at 06:12

## 2023-11-08 RX ADMIN — METFORMIN HCL 1000 MG: 500 TABLET ORAL at 18:13

## 2023-11-08 NOTE — PLAN OF CARE
Goal Outcome Evaluation:  Plan of Care Reviewed With: patient, spouse        Progress: improving  Outcome Evaluation: VSS: FF/ML/U1 with scant lochia, pt voiding, pt passing flatus- had BM today, incision with steri stripes clean dry and intact no symptoms of infection, blood glucose WNL today, pt ambulating, changed to ultram today- pt states that helps much better with pain, patient bonding well with infant

## 2023-11-08 NOTE — PROGRESS NOTES
"    PJ Guzmán  [facility]  [address]  [place] [address]  Office [phone]  Fax [phone]      [facility]   PROGRESS NOTE    Post-Op Day 2 S/P   Subjective     Patient reports:  Pain is controlled with  ERAS protocol, including Motin, Tylenol, and Oxycodone . She does not want to take the Oxycodone during the day due to it making her \"sleepy.\" She is ambulating. Tolerating diet. Voiding - without difficulty; flatus reported..  Vaginal bleeding is light.    She is pumping for expressed breast milk and supplementing with Sensitive formula.      Objective      Vitals: Vital Signs Range for the last 24 hours  Temperature: Temp:  [97.6 °F (36.4 °C)-98.3 °F (36.8 °C)] 97.8 °F (36.6 °C)   Temp Source: Temp src: Oral   BP: BP: (108-120)/(59-83) 118/62   Pulse: Heart Rate:  [] 84   Respirations: Resp:  [18-20] 18   SPO2: SpO2:  [97 %-98 %] 98 %   O2 Amount (l/min):     O2 Devices Device (Oxygen Therapy): room air   Weight:              Physical Exam    Lungs Respirations even and unlabored.    Abdomen Soft. Fundus firm, non-tender.   Incision  no drainage, no erythema, no swelling, well approximated, steri-strips intact   Extremities edema trace pedal and extremities normal, atraumatic, no cyanosis     I reviewed the patient's new clinical results.  Lab Results (last 24 hours)       Procedure Component Value Units Date/Time    POC Glucose Once [[id number]]  (Normal) Collected: 23 0614    Specimen: Blood Updated: 23 0626     Glucose 125 mg/dL      Comment: : [id number] Ulises JordaneyMeter ID: [id number]       POC Glucose Once [[id number]]  (Normal) Collected: 23    Specimen: Blood Updated: 23     Glucose 112 mg/dL      Comment: : [id number] Ulises RileyMeter ID: [id number]       POC Glucose Once [[id number]]  (Normal) Collected: 23 1611    Specimen: Blood Updated: 23 1622     Glucose 122 mg/dL      Comment: : " [id number] Pérez Hernandez ID: [id number]       POC Glucose Once [[id number]]  (Abnormal) Collected: 11/07/23 1034    Specimen: Blood Updated: 11/07/23 1045     Glucose 163 mg/dL      Comment: : [id number] Sabiha Cano ID: [id number]               External Prenatal Results       Pregnancy Outside Results - Transcribed From Office Records - See Scanned Records For Details       Test Value Date Time    ABO  O  11/06/23 0552    Rh  Positive  11/06/23 0552    Antibody Screen  Negative  11/06/23 0552       Negative  03/29/23 1124    Varicella IgG       Rubella  2.68 index 03/29/23 1124    Hgb  9.4 g/dL 11/07/23 0602       10.7 g/dL 11/06/23 0552       10.9 g/dL 09/05/23 0835       12.8 g/dL 03/29/23 1124    Hct  29.9 % 11/07/23 0602       33.7 % 11/06/23 0552       39.2 % 03/29/23 1124    Glucose Fasting GTT       Glucose Tolerance Test 1 hour       Glucose Tolerance Test 3 hour       Gonorrhea (discrete)  Negative  10/16/23 1500       Negative  03/29/23 1124    Chlamydia (discrete)  Negative  10/16/23 1500       Negative  03/29/23 1124    RPR  Non Reactive  03/29/23 1124    VDRL       Syphilis Antibody       HBsAg  Negative  03/29/23 1124    Herpes Simplex Virus PCR       Herpes Simplex VIrus Culture       HIV  Non Reactive  03/29/23 1124    Hep C RNA Quant PCR       Hep C Antibody  Non Reactive  03/29/23 1124    AFP       Group B Strep  Negative  10/16/23 1500    GBS Susceptibility to Clindamycin       GBS Susceptibility to Erythromycin       Fetal Fibronectin       Genetic Testing, Maternal Blood                 Drug Screening       Test Value Date Time    Urine Drug Screen       Amphetamine Screen       Barbiturate Screen       Benzodiazepine Screen       Methadone Screen       Phencyclidine Screen       Opiates Screen       THC Screen       Cocaine Screen       Propoxyphene Screen       Buprenorphine Screen       Methamphetamine Screen       Oxycodone Screen       Tricyclic Antidepressants Screen                  Legend    ^: Historical                            Assessment & Plan        Fetal macrosomia in pregnancy in third trimester    39 weeks gestation of pregnancy    Oblique fetal presentation, antepartum      Assessment:    Maritza Vazquez is Day 2  post-partum  , Low Transverse   .       Plan: Continue postpartum and postoperative plan of care.  Discussed trial of Ultram prior to discharge.  Discharge instructions discussed and understanding verbalized.  Outpatient lactation support as needed.  Return to office in 2 weeks for incision check.      Plan for discharge reviewed with Dr. Saab.     This note has been signed electronically.    Daya Sutton, DENISE, APRN, CNM, RNC-OB  2023  08:04 CST    I was present for rounds and agree with current plan of care.

## 2023-11-08 NOTE — LACTATION NOTE
Mother's Name: Maritza Phone #: [phone]  Infant Name: Danielle  : 23  Gestation: 39w1d  Day of life: 2  Birth weight: 10-4 (4650g) Discharge weight:   Weight Loss: -3.12%  24 hour Summary of Feeds: Formula only  Voids: 5  Stools: 4  Assistive devices (shields, shells, etc):  Significant Maternal history: , GDM, PCOS, Depression, Migraines  Maternal Concerns: None   Maternal Goal: Wants to try to breastfeed but fine with formula feeding. Will not breastfeed if it becomes stressful.   Mother's Medications: PNV, Ergocalciferol, Insuline Glargine, Propanolol, Synthroid, Zoloft, Metformin  Breastpump for home: Declined Rx.  Ped follow up appt: [facility]    [facility] Consult. Attempted lactation consult. NICU Breastfeeding folder of handouts and information to bsd table. Will attempt consult again later today. Patient has been instructed on pumping plan and instructions when supplementing yesterday. Infant also starting phototherapy.     Instructed mom our lactation team is here for continued support throughout their breastfeeding journey. Our team has encouraged mom to call with any questions or concerns that may arise after discharge.

## 2023-11-08 NOTE — PLAN OF CARE
Goal Outcome Evaluation:      VSS. Voiding. Ambulating. Passing flatus. FFMLU1 scant rubra bleeding. Breast and formula feeding. Bonding well with infant.

## 2023-11-08 NOTE — OUTREACH NOTE
[facility]  IP Postpartum    Questions/Answers      Flowsheet Row Responses   Best Method for Contacting Cell   Support Person Present Yes   Does the patient have a car seat at the hospital Yes   Delivery Note Reviewed Reviewed   Were birth expectations met? Yes   Is there a need for additional support/resources? No   Lactation Note Reviewed Reviewed   Is additional support needed? No   Any questions or concerns? No   Is the patient going to use Meds to Beds? Yes   Any concerns related discharge meds/ability to  prescriptions? No   OB Discharge Navigator Reviewed  Reviewed   Confirm Postpartum OB appointment Yes   Postpartum OB appointment date 23   Does patient have transportation to appointments? Yes   Any other assistance needed to ensure she is able to attend appointments? No   Does patient have supplies needed at home for  care? Breast Pump, Clothing, Crib, Diapers, Formula          At patient's bedside.  Patient states she has all necessary items at home to take care of herself and baby.  Postpartum check-in scheduled and reminder card given.      Nuris Cabrera RN  Maternity Nurse Navigator    2023, 09:20 CST

## 2023-11-09 VITALS
TEMPERATURE: 97.6 F | RESPIRATION RATE: 18 BRPM | DIASTOLIC BLOOD PRESSURE: 71 MMHG | HEART RATE: 79 BPM | OXYGEN SATURATION: 97 % | SYSTOLIC BLOOD PRESSURE: 123 MMHG

## 2023-11-09 LAB — GLUCOSE BLDC GLUCOMTR-MCNC: 80 MG/DL (ref 70–130)

## 2023-11-09 PROCEDURE — 82948 REAGENT STRIP/BLOOD GLUCOSE: CPT

## 2023-11-09 PROCEDURE — 25010000002 ENOXAPARIN PER 10 MG: Performed by: NURSE PRACTITIONER

## 2023-11-09 RX ORDER — ACETAMINOPHEN 325 MG/1
650 TABLET ORAL EVERY 6 HOURS PRN
Start: 2023-11-09

## 2023-11-09 RX ORDER — IBUPROFEN 200 MG
400 TABLET ORAL EVERY 4 HOURS PRN
Start: 2023-11-09

## 2023-11-09 RX ORDER — TRAMADOL HYDROCHLORIDE 50 MG/1
50 TABLET ORAL EVERY 8 HOURS PRN
Qty: 9 TABLET | Refills: 0 | Status: SHIPPED | OUTPATIENT
Start: 2023-11-09

## 2023-11-09 RX ADMIN — LEVOTHYROXINE SODIUM 50 MCG: 50 TABLET ORAL at 06:33

## 2023-11-09 RX ADMIN — PRENATAL VIT W/ FE FUMARATE-FA TAB 27-0.8 MG 1 TABLET: 27-0.8 TAB at 08:00

## 2023-11-09 RX ADMIN — IBUPROFEN 600 MG: 600 TABLET, FILM COATED ORAL at 09:20

## 2023-11-09 RX ADMIN — ENOXAPARIN SODIUM 40 MG: 100 INJECTION SUBCUTANEOUS at 08:00

## 2023-11-09 RX ADMIN — ACETAMINOPHEN 650 MG: 325 TABLET, FILM COATED ORAL at 06:33

## 2023-11-09 RX ADMIN — IBUPROFEN 600 MG: 600 TABLET, FILM COATED ORAL at 03:36

## 2023-11-09 RX ADMIN — METFORMIN HCL 1000 MG: 500 TABLET ORAL at 07:58

## 2023-11-09 RX ADMIN — ACETAMINOPHEN 650 MG: 325 TABLET, FILM COATED ORAL at 00:38

## 2023-11-09 RX ADMIN — TRAMADOL HYDROCHLORIDE 50 MG: 50 TABLET, COATED ORAL at 07:58

## 2023-11-09 NOTE — DISCHARGE SUMMARY
PJ Guzmán  [facility]  [address]  [place] [address]  Office [phone]  Fax [phone]    Discharge Summary    [facility]  Marit[facility]az[facility]  : 1990  MRN: [id number]  CSN: [id number]    Date of Admission: 2023   Date of Discharge:  2023   Delivering Physician: Abner Galvez MD       Admission Diagnosis: Fetal macrosomia during pregnancy in third trimester, single or unspecified fetus [O36.63X0]  39 weeks gestation of pregnancy [Z3A.39]   Discharge Diagnosis: Pregnancy at 39w1d - delivered       Procedures: Primary  (LTCS)     Hospital Course  See the completed operative report for details regarding antepartum course and delivery.    Her postoperative course was unremarkable.  On POD # 3 she felt like she was ready for discharge.  She was evaluated by Dr. Galvez who agreed she was able to be discharged to home.  She had no febrile morbidity. She had normal bowel and bladder function and was hemodynamically stable.  Her wound was healing well without obvious signs of infections.    Infant  female  fetus weighing 4650 g (10 lb 4 oz)   Apgars -  8 @ 1 minute /  8 @ 5 minutes.    Discharge labs  Lab Results   Component Value Date    WBC 8.93 2023    HGB 9.4 (L) 2023    HCT 29.9 (L) 2023     2023       Discharge Medications     Discharge Medications        New Medications        Instructions Start Date   acetaminophen 325 MG tablet  Commonly known as: TYLENOL   650 mg, Oral, Every 6 Hours PRN      ibuprofen 200 MG tablet  Commonly known as: ADVIL,MOTRIN   400 mg, Oral, Every 4 Hours PRN      traMADol 50 MG tablet  Commonly known as: ULTRAM   50 mg, Oral, Every 8 Hours PRN             Changes to Medications        Instructions Start Date   metFORMIN 500 MG tablet  Commonly known as: GLUCOPHAGE  What changed: Another medication with the same name was removed. Continue taking this  medication, and follow the directions you see here.   1,000 mg, Oral, 2 Times Daily With Meals             Continue These Medications        Instructions Start Date   albuterol 1.25 MG/3ML nebulizer solution  Commonly known as: ACCUNEB   No dose, route, or frequency recorded.      B-D UF III MINI PEN NEEDLES 31G X 5 MM misc  Generic drug: Insulin Pen Needle       BASAGLAR KWIKPEN 100 UNIT/ML injection pen   50 Units, Subcutaneous, Daily      ergocalciferol 1.25 MG (48213 UT) capsule  Commonly known as: ERGOCALCIFEROL   ergocalciferol (vitamin D2) 1,250 mcg (50,000 unit) capsule   1 capsule weekly      Lancets misc   1 each, Does not apply, 4 Times Daily, Pt to test fasting as well as 1hr postprandial, total four times daily      levothyroxine 50 MCG tablet  Commonly known as: SYNTHROID, LEVOTHROID   No dose, route, or frequency recorded.      prenatal (CLASSIC) vitamin 28-0.8 MG tablet tablet  Generic drug: prenatal vitamin   1 tablet, Oral, Daily      propranolol 20 MG tablet  Commonly known as: INDERAL   20 mg, Oral, Nightly      sertraline 25 MG tablet  Commonly known as: Zoloft   25 mg, Oral, Daily      True Metrix Blood Glucose Test test strip  Generic drug: glucose blood   200 each, Other, 4 Times Daily, Use as instructed               External Prenatal Results       Pregnancy Outside Results - Transcribed From Office Records - See Scanned Records For Details       Test Value Date Time    ABO  O  11/06/23 0552    Rh  Positive  11/06/23 0552    Antibody Screen  Negative  11/06/23 0552       Negative  03/29/23 1124    Varicella IgG       Rubella  2.68 index 03/29/23 1124    Hgb  9.4 g/dL 11/07/23 0602       10.7 g/dL 11/06/23 0552       10.9 g/dL 09/05/23 0835       12.8 g/dL 03/29/23 1124    Hct  29.9 % 11/07/23 0602       33.7 % 11/06/23 0552       39.2 % 03/29/23 1124    Glucose Fasting GTT       Glucose Tolerance Test 1 hour       Glucose Tolerance Test 3 hour       Gonorrhea (discrete)  Negative  10/16/23  1500       Negative  03/29/23 1124    Chlamydia (discrete)  Negative  10/16/23 1500       Negative  03/29/23 1124    RPR  Non Reactive  03/29/23 1124    VDRL       Syphilis Antibody       HBsAg  Negative  03/29/23 1124    Herpes Simplex Virus PCR       Herpes Simplex VIrus Culture       HIV  Non Reactive  03/29/23 1124    Hep C RNA Quant PCR       Hep C Antibody  Non Reactive  03/29/23 1124    AFP       Group B Strep  Negative  10/16/23 1500    GBS Susceptibility to Clindamycin       GBS Susceptibility to Erythromycin       Fetal Fibronectin       Genetic Testing, Maternal Blood                 Drug Screening       Test Value Date Time    Urine Drug Screen       Amphetamine Screen       Barbiturate Screen       Benzodiazepine Screen       Methadone Screen       Phencyclidine Screen       Opiates Screen       THC Screen       Cocaine Screen       Propoxyphene Screen       Buprenorphine Screen       Methamphetamine Screen       Oxycodone Screen       Tricyclic Antidepressants Screen                 Legend    ^: Historical                            Discharge Disposition Home or Self Care   Condition on Discharge: good   Follow-up: 2 weeks with  Dr. Galvez     Plan for discharge reviewed with Dr. Galvez.    This note has been signed electronically.    Daya Sutton, DNP, APRN, CNM, RNC-OB  11/9/2023

## 2023-11-09 NOTE — PLAN OF CARE
Goal Outcome Evaluation:         VSS. FFMLU1 scant rubra bleeding. Voiding. Ambulating. LBM 11/8. Passing flatus. Pumping. Bottle feeding. Fasting and post prandial glucose checks. Incision with steri strips, CDI. Bonding well with infant.

## 2023-11-09 NOTE — PROGRESS NOTES
"    PJ Guzmán  [facility]  [address]  [place] [address]  Office [phone]  Fax [phone]      [facility]   PROGRESS NOTE    Post-Op Day 3 S/P   Subjective     Patient reports:  Pain is well controlled with ERAS protocol. She does feel she does not feel \"loopy\" with the ultram and desires this for pain medicine at discharge.  She is ambulating. Tolerating diet. Voiding - without difficulty; flatus and bowel movement reported..  Vaginal bleeding is light.      Objective      Vitals: Vital Signs Range for the last 24 hours  Temperature: Temp:  [97.4 °F (36.3 °C)-98 °F (36.7 °C)] 97.6 °F (36.4 °C)   Temp Source: Temp src: Temporal   BP: BP: (106-130)/(63-75) 123/71   Pulse: Heart Rate:  [79-99] 79   Respirations: Resp:  [18-20] 18   SPO2: SpO2:  [97 %-98 %] 97 %   O2 Amount (l/min):     O2 Devices Device (Oxygen Therapy): room air   Weight:              Physical Exam    Lungs Respirations even and unlabored.   Abdomen Soft. Fundus firm and non-tender.   Incision  no drainage, no erythema, no swelling, well approximated, steri-strips intact   Extremities extremities normal, atraumatic, no cyanosis. Trace pedal edema     I reviewed the patient's new clinical results.  Lab Results (last 24 hours)       Procedure Component Value Units Date/Time    POC Glucose Once [[id number]]  (Normal) Collected: 23 0635    Specimen: Blood Updated: 23 0651     Glucose 80 mg/dL      Comment: : [id number] Ulises JordaneyMeter ID: [id number]       POC Glucose Once [[id number]]  (Normal) Collected: 23 2107    Specimen: Blood Updated: 23 2118     Glucose 107 mg/dL      Comment: : [id number] Ulises RileyMeter ID: [id number]       POC Glucose Once [[id number]]  (Normal) Collected: 23 1704    Specimen: Blood Updated: 23 1715     Glucose 126 mg/dL      Comment: : [id number] Dyllan Ferreiraer ID: [id number]       POC Glucose Once [[id number]]  (Abnormal) " Collected: 11/08/23 1110    Specimen: Blood Updated: 11/08/23 1121     Glucose 157 mg/dL      Comment: : [id number] Ro MilaMeter ID: [id number]               External Prenatal Results       Pregnancy Outside Results - Transcribed From Office Records - See Scanned Records For Details       Test Value Date Time    ABO  O  11/06/23 0552    Rh  Positive  11/06/23 0552    Antibody Screen  Negative  11/06/23 0552       Negative  03/29/23 1124    Varicella IgG       Rubella  2.68 index 03/29/23 1124    Hgb  9.4 g/dL 11/07/23 0602       10.7 g/dL 11/06/23 0552       10.9 g/dL 09/05/23 0835       12.8 g/dL 03/29/23 1124    Hct  29.9 % 11/07/23 0602       33.7 % 11/06/23 0552       39.2 % 03/29/23 1124    Glucose Fasting GTT       Glucose Tolerance Test 1 hour       Glucose Tolerance Test 3 hour       Gonorrhea (discrete)  Negative  10/16/23 1500       Negative  03/29/23 1124    Chlamydia (discrete)  Negative  10/16/23 1500       Negative  03/29/23 1124    RPR  Non Reactive  03/29/23 1124    VDRL       Syphilis Antibody       HBsAg  Negative  03/29/23 1124    Herpes Simplex Virus PCR       Herpes Simplex VIrus Culture       HIV  Non Reactive  03/29/23 1124    Hep C RNA Quant PCR       Hep C Antibody  Non Reactive  03/29/23 1124    AFP       Group B Strep  Negative  10/16/23 1500    GBS Susceptibility to Clindamycin       GBS Susceptibility to Erythromycin       Fetal Fibronectin       Genetic Testing, Maternal Blood                 Drug Screening       Test Value Date Time    Urine Drug Screen       Amphetamine Screen       Barbiturate Screen       Benzodiazepine Screen       Methadone Screen       Phencyclidine Screen       Opiates Screen       THC Screen       Cocaine Screen       Propoxyphene Screen       Buprenorphine Screen       Methamphetamine Screen       Oxycodone Screen       Tricyclic Antidepressants Screen                 Legend    ^: Historical                            Assessment & Plan        Fetal  macrosomia in pregnancy in third trimester    39 weeks gestation of pregnancy    Oblique fetal presentation, antepartum      Assessment:    Maritza Vazquez is Day 3  post-partum  , Low Transverse   .       Plan:  Continue postpartum and postoperative plan of care. Discharge instructions discussed and understanding verbalized. Return to the office in 2 weeks with Dr. Galvez for an incision check..      Plan for discharge reviewed with Dr. Galvez.     This note has been signed electronically.    Daya Sutton DNP, APRN, CNM, RNC-OB  2023  09:10 CST

## 2023-11-15 ENCOUNTER — PATIENT OUTREACH (OUTPATIENT)
Dept: LABOR AND DELIVERY | Facility: HOSPITAL | Age: 33
End: 2023-11-15

## 2023-11-15 NOTE — OUTREACH NOTE
[facility]  Unable to Reach       Questions/Answers      Flowsheet Row Responses   Pending Outreach Postpartum Check-in   Call Attempt First   Outcome No answer/busy, Left message                Nuris Cabrera RN  Maternity Nurse Navigator    11/15/2023, 13:01 CST

## 2023-11-16 ENCOUNTER — PATIENT OUTREACH (OUTPATIENT)
Dept: LABOR AND DELIVERY | Facility: HOSPITAL | Age: 33
End: 2023-11-16

## 2023-11-16 NOTE — OUTREACH NOTE
[facility]  Unable to Reach       Questions/Answers      Flowsheet Row Responses   Pending Outreach Postpartum Check-in   Call Attempt Second   Outcome No answer/busy, Left message, MyChart message sent to patient            Program forwarded to RN call center    Nuris Cabrrea RN  Maternity Nurse Navigator    11/16/2023, 12:47 CST

## 2023-11-20 ENCOUNTER — POSTPARTUM VISIT (OUTPATIENT)
Dept: OBSTETRICS AND GYNECOLOGY | Facility: CLINIC | Age: 33
End: 2023-11-20
Payer: COMMERCIAL

## 2023-11-20 ENCOUNTER — PATIENT OUTREACH (OUTPATIENT)
Dept: LABOR AND DELIVERY | Facility: HOSPITAL | Age: 33
End: 2023-11-20
Payer: COMMERCIAL

## 2023-11-20 VITALS
BODY MASS INDEX: 39.51 KG/M2 | SYSTOLIC BLOOD PRESSURE: 114 MMHG | HEIGHT: 63 IN | WEIGHT: 223 LBS | DIASTOLIC BLOOD PRESSURE: 82 MMHG

## 2023-11-20 DIAGNOSIS — Z09 POSTOP CHECK: Primary | ICD-10-CM

## 2023-11-20 PROCEDURE — 99024 POSTOP FOLLOW-UP VISIT: CPT | Performed by: OBSTETRICS & GYNECOLOGY

## 2023-11-20 NOTE — OUTREACH NOTE
[facility]    Unsuccessful postpartum check-in.  Forwarded to RN call center    Nuris Cabrera RN  Maternity Nurse Navigator    11/20/2023, 08:48 CST

## 2023-11-20 NOTE — PROGRESS NOTES
"Maritza Vazquez is a 33 y.o. female here today for incision check after undergoing  on .  She has been doing well since her discharge from the hospital and denies fevers, significant abdominal pain, nausea, or problems with her incision.  Her infant is breast-feeding well and she denies postpartum blues.    /82 (BP Location: Left arm, Patient Position: Sitting)   Ht 160 cm (63\")   Wt 101 kg (223 lb)   Breastfeeding Yes   BMI 39.50 kg/m²   In general pleasant female no acute distress  Abdomen soft and nontender  Her surgical taping is removed and her incision is healing well without signs of infection    Assessment: Normal incision check after a     She will continue with light activities and pelvic rest.  She will followup in 4 weeks for a postpartum visit and call in the meantime if she has any questions or concerns.   "

## 2023-11-24 ENCOUNTER — PATIENT OUTREACH (OUTPATIENT)
Dept: CALL CENTER | Facility: HOSPITAL | Age: 33
End: 2023-11-24
Payer: COMMERCIAL

## 2023-11-24 NOTE — OUTREACH NOTE
[facility] Survey      Flowsheet Row Responses   [facility] facility patient discharged from? [place]   Week 1 attempt successful? No   Unsuccessful attempts Attempt 2   Reschedule Tomorrow              Laly ZARATE - Registered Nurse

## 2023-11-24 NOTE — OUTREACH NOTE
[facility] Survey      Flowsheet Row Responses   Confucianist facility patient discharged from? [place]   Week 1 attempt successful? No   Unsuccessful attempts Attempt 1   Reschedule Today              Laly ZARATE - Registered Nurse

## 2023-11-26 ENCOUNTER — PATIENT OUTREACH (OUTPATIENT)
Dept: CALL CENTER | Facility: HOSPITAL | Age: 33
End: 2023-11-26
Payer: COMMERCIAL

## 2023-11-26 NOTE — OUTREACH NOTE
[facility] Survey      Flowsheet Row Responses   Tenri[facility] facility patient discharged from? [place]   Week 1 attempt successful? No   Unsuccessful attempts Attempt 3              Laly ZARATE - Registered Nurse

## 2023-12-12 ENCOUNTER — POSTPARTUM VISIT (OUTPATIENT)
Dept: OBSTETRICS AND GYNECOLOGY | Facility: CLINIC | Age: 33
End: 2023-12-12
Payer: COMMERCIAL

## 2023-12-12 VITALS
BODY MASS INDEX: 39.51 KG/M2 | HEIGHT: 63 IN | DIASTOLIC BLOOD PRESSURE: 78 MMHG | WEIGHT: 223 LBS | SYSTOLIC BLOOD PRESSURE: 116 MMHG

## 2023-12-12 DIAGNOSIS — F41.9 ANXIETY: ICD-10-CM

## 2023-12-12 DIAGNOSIS — F32.A DEPRESSION, UNSPECIFIED DEPRESSION TYPE: Primary | ICD-10-CM

## 2023-12-12 RX ORDER — BUSPIRONE HYDROCHLORIDE 5 MG/1
5 TABLET ORAL 3 TIMES DAILY
Qty: 90 TABLET | Refills: 0 | Status: SHIPPED | OUTPATIENT
Start: 2023-12-12 | End: 2024-01-11

## 2023-12-12 NOTE — PROGRESS NOTES
Subjective   Maritza Vazquez is a 33 y.o. female.     History of Present Illness  The patient presents to the office today for evaluation of postpartum depression and anxiety symptoms. The patient has been taking Zoloft prescribed during her pregnancy with continued use after birth and she states that she feels it is no longer working for her. The patient reports her last dose of Zoloft was Saturday. The patient reports that she did wean dosing and did not stop this abruptly. The patient states she has struggled with depression and anxiety her whole life. The patient reports she has noticed an increase in her anxiety level during her postpartum period as well. The patient denies SI/HI at this time.   Depression  Visit Type: follow-up  Patient presents with the following symptoms: anhedonia, depressed mood, excessive worry and nervousness/anxiety.  Patient is not experiencing: chest pain, choking sensation, compulsions, confusion, decreased concentration, dizziness, dry mouth, fatigue, feelings of hopelessness, feelings of worthlessness, hypersomnia, hyperventilation, impotence, insomnia, irritability, malaise, memory impairment, muscle tension, nausea, obsessions, palpitations, panic, psychomotor agitation, psychomotor retardation, restlessness, shortness of breath, suicidal ideas, suicidal planning, thoughts of death, weight gain and weight loss.  Frequency of symptoms: most days   Severity: moderate   Nighttime awakenings: several       Review of Systems   Constitutional: Negative.  Negative for irritability, unexpected weight gain and unexpected weight loss.   HENT: Negative.  Negative for swollen glands, trouble swallowing and voice change.    Eyes: Negative.    Respiratory: Negative.  Negative for choking and shortness of breath.    Cardiovascular: Negative.  Negative for palpitations.   Gastrointestinal: Negative.    Endocrine: Negative.    Genitourinary: Negative.  Negative for impotence.   Musculoskeletal:  Negative.    Skin: Negative.    Allergic/Immunologic: Negative.    Neurological:  Negative for confusion.   Hematological: Negative.    Psychiatric/Behavioral:  Positive for depressed mood and stress. Negative for agitation, behavioral problems, decreased concentration, dysphoric mood, hallucinations, self-injury, suicidal ideas and negative for hyperactivity. The patient is nervous/anxious. The patient does not have insomnia.        Objective   Physical Exam  Vitals and nursing note reviewed.   Constitutional:       General: She is not in acute distress.     Appearance: Normal appearance. She is not ill-appearing or diaphoretic.   HENT:      Head: Normocephalic and atraumatic.   Cardiovascular:      Rate and Rhythm: Normal rate and regular rhythm.      Pulses: Normal pulses.      Heart sounds: Normal heart sounds.   Pulmonary:      Effort: Pulmonary effort is normal. No respiratory distress.      Breath sounds: Normal breath sounds.   Abdominal:      General: Bowel sounds are normal.      Palpations: Abdomen is soft.   Musculoskeletal:         General: No deformity.      Cervical back: Normal range of motion.   Skin:     Coloration: Skin is not pale.   Neurological:      General: No focal deficit present.      Mental Status: She is alert.   Psychiatric:         Mood and Affect: Mood normal.         Behavior: Behavior normal.         Thought Content: Thought content normal.         Judgment: Judgment normal.       Assessment & Plan   Diagnoses and all orders for this visit:    1. Depression, unspecified depression type (Primary)  Comments:  The patient presents today for evaluation of postpartum depression symptoms. The patient has been taking Zoloft during and after pregnancy but feels that it is not working for her now. The patient states she did taper dosing and did not stop the medication abruptly. Today, the patient denies SI/HI. The patient also reports she is on maintenance Synthroid dosing. She will have  labs drawn today to check these levels. At this time, the patient is agreeable to increase dose of Zoloft with the addition of Buspar as needed. The patient does have an upcoming appointment to see Dr. Galvez on 12/18/23. The patient was instructed if she felt like her symptoms were worsening to contact the office immediately for an office visit.     The patient will have Genesight testing completed today in the office.      Orders:  -     TSH Rfx On Abnormal To Free T4  -     sertraline (Zoloft) 50 MG tablet; Take 1 tablet by mouth Daily.  Dispense: 30 tablet; Refill: 2    2. Anxiety  Comments:  The patient reports that she has noted an increase in her anxiety level during her postpartum period. The patient reports that she has dealt with anxiety her e  Orders:  -     TSH Rfx On Abnormal To Free T4  -     busPIRone (BUSPAR) 5 MG tablet; Take 1 tablet by mouth 3 (Three) Times a Day for 30 days.  Dispense: 90 tablet; Refill: 0       PJ Agudelo

## 2023-12-18 ENCOUNTER — POSTPARTUM VISIT (OUTPATIENT)
Dept: OBSTETRICS AND GYNECOLOGY | Facility: CLINIC | Age: 33
End: 2023-12-18
Payer: COMMERCIAL

## 2023-12-18 VITALS
BODY MASS INDEX: 40.04 KG/M2 | DIASTOLIC BLOOD PRESSURE: 84 MMHG | WEIGHT: 226 LBS | SYSTOLIC BLOOD PRESSURE: 112 MMHG | HEIGHT: 63 IN

## 2023-12-18 PROBLEM — N97.0 INFERTILITY ASSOCIATED WITH ANOVULATION: Status: RESOLVED | Noted: 2017-02-16 | Resolved: 2023-12-18

## 2023-12-18 PROBLEM — O24.919: Status: RESOLVED | Noted: 2023-07-18 | Resolved: 2023-12-18

## 2023-12-18 PROBLEM — O32.2XX0: Status: RESOLVED | Noted: 2023-11-06 | Resolved: 2023-12-18

## 2023-12-18 PROBLEM — Z3A.39 39 WEEKS GESTATION OF PREGNANCY: Status: RESOLVED | Noted: 2023-11-06 | Resolved: 2023-12-18

## 2023-12-18 PROBLEM — O36.63X0 FETAL MACROSOMIA IN PREGNANCY IN THIRD TRIMESTER: Status: RESOLVED | Noted: 2023-10-16 | Resolved: 2023-12-18

## 2023-12-18 PROCEDURE — 0503F POSTPARTUM CARE VISIT: CPT | Performed by: OBSTETRICS & GYNECOLOGY

## 2023-12-18 RX ORDER — DROSPIRENONE 4 MG/1
1 TABLET, FILM COATED ORAL DAILY
Qty: 28 TABLET | Refills: 2 | COMMUNITY
Start: 2023-12-18

## 2023-12-18 NOTE — PROGRESS NOTES
"Maritza Vazquez is here for a postpartum visit after a  6 weeks ago.  The depression questionnaire has been completed.  She has no signs of worsening postpartum depression, and she is already on Zoloft and Buspar.  She has not had a period since her delivery and is formula-feeding her infant without difficulty.  Her last Pap smear was 2017 and normal.  She has no history of cervical dysplasia.  She would like OCPs for contraception, and has had nausea with multiple OCPs in the past.    /84 (BP Location: Left arm, Patient Position: Sitting)   Ht 160 cm (62.99\")   Wt 103 kg (226 lb)   Breastfeeding No   BMI 40.05 kg/m²    In general pleasant female no acute distress  Neck no thyromegaly  Abdomen soft and nontender, the incision is well healed  A Pap smear was not performed.     Assessment: Normal postpartum exam.    We have discussed current Pap smear screening guidelines. I have given her a sample of Slynd, and we have discussed common side effects and adverse events. Maritza will return in 2-3 months for pill follow up and a Pap smear, or sooner if needed.  "

## 2024-01-04 RX ORDER — DESVENLAFAXINE SUCCINATE 50 MG/1
50 TABLET, EXTENDED RELEASE ORAL DAILY
Qty: 30 TABLET | Refills: 12 | Status: SHIPPED | OUTPATIENT
Start: 2024-01-04

## 2024-02-28 ENCOUNTER — OFFICE VISIT (OUTPATIENT)
Dept: OBSTETRICS AND GYNECOLOGY | Age: 34
End: 2024-02-28
Payer: COMMERCIAL

## 2024-02-28 VITALS
DIASTOLIC BLOOD PRESSURE: 84 MMHG | SYSTOLIC BLOOD PRESSURE: 128 MMHG | WEIGHT: 237 LBS | BODY MASS INDEX: 41.99 KG/M2 | HEIGHT: 63 IN

## 2024-02-28 DIAGNOSIS — Z01.419 ENCOUNTER FOR WELL WOMAN EXAM WITH ROUTINE GYNECOLOGICAL EXAM: Primary | ICD-10-CM

## 2024-02-28 DIAGNOSIS — Z12.4 ENCOUNTER FOR SCREENING FOR CERVICAL CANCER: ICD-10-CM

## 2024-02-28 PROBLEM — O36.60X0 LGA (LARGE FOR GESTATIONAL AGE) FETUS AFFECTING MOTHER, ANTEPARTUM: Status: ACTIVE | Noted: 2023-10-12

## 2024-02-28 PROCEDURE — 87624 HPV HI-RISK TYP POOLED RSLT: CPT | Performed by: NURSE PRACTITIONER

## 2024-02-28 PROCEDURE — G0123 SCREEN CERV/VAG THIN LAYER: HCPCS | Performed by: NURSE PRACTITIONER

## 2024-02-28 NOTE — PROGRESS NOTES
"    Nataly Vazquez is a 34 y.o. female  YOB: 1990        Chief Complaint   Patient presents with    Gynecologic Exam     \"New patient\" here for annual, last pap 2017, patient denies any problems or concerns.        Gynecologic Exam  The patient's pertinent negatives include no pelvic pain. Pertinent negatives include no abdominal pain, back pain, constipation, diarrhea, dysuria, fever, frequency, hematuria, nausea, rash, sore throat, urgency or vomiting.       The following portions of the patient's history were reviewed and updated as appropriate: allergies, current medications, past family history, past medical history, past social history, past surgical history, and problem list.    No Known Allergies    Past Medical History:   Diagnosis Date    Abnormal Pap smear of cervix 2007ish?    Depression 2009ish?    Have struggled with depression and anxiety for years    Goiter     Migraine     PCOS (polycystic ovarian syndrome)     Polycystic ovary syndrome        Family History   Problem Relation Age of Onset    No Known Problems Father     No Known Problems Mother     No Known Problems Brother     Diabetes Paternal Grandfather     Colon cancer Maternal Grandfather     No Known Problems Brother     Breast cancer Neg Hx     Ovarian cancer Neg Hx        Social History     Socioeconomic History    Marital status:    Tobacco Use    Smoking status: Former     Packs/day: .1     Types: Cigarettes, Electronic Cigarette    Smokeless tobacco: Never   Vaping Use    Vaping Use: Former    Substances: Nicotine   Substance and Sexual Activity    Alcohol use: Not Currently    Drug use: No    Sexual activity: Yes     Partners: Male     Birth control/protection: None         Current Outpatient Medications:     acetaminophen (TYLENOL) 325 MG tablet, Take 2 tablets by mouth Every 6 (Six) Hours As Needed for Mild Pain., Disp: , Rfl:     albuterol (ACCUNEB) 1.25 MG/3ML nebulizer solution, , Disp: , Rfl:     " desvenlafaxine (Pristiq) 50 MG 24 hr tablet, Take 1 tablet by mouth Daily., Disp: 30 tablet, Rfl: 12    ergocalciferol (ERGOCALCIFEROL) 1.25 MG (01786 UT) capsule, ergocalciferol (vitamin D2) 1,250 mcg (50,000 unit) capsule  1 capsule weekly, Disp: , Rfl:     ibuprofen (ADVIL,MOTRIN) 200 MG tablet, Take 2 tablets by mouth Every 4 (Four) Hours As Needed for Mild Pain., Disp: , Rfl:     levothyroxine (SYNTHROID, LEVOTHROID) 50 MCG tablet, , Disp: , Rfl:     metFORMIN (GLUCOPHAGE) 500 MG tablet, Take 2 tablets by mouth 2 (Two) Times a Day With Meals., Disp: 120 tablet, Rfl: 3    prenatal vitamin (prenatal, CLASSIC, vitamin) tablet, Take 1 tablet by mouth Daily., Disp: , Rfl:     propranolol (INDERAL) 20 MG tablet, Take 1 tablet by mouth Every Night., Disp: , Rfl:     busPIRone (BUSPAR) 5 MG tablet, Take 1 tablet by mouth 3 (Three) Times a Day for 30 days., Disp: 90 tablet, Rfl: 0    Patient's last menstrual period was 2024.    Sexual History:           Could not be calculated    Past Surgical History:   Procedure Laterality Date     SECTION N/A 2023    Procedure:  SECTION PRIMARY;  Surgeon: Abner Galvez MD;  Location: [facility] LABOR DELIVERY;  Service: Obstetrics/Gynecology;  Laterality: N/A;    COLPOSCOPY W/ BIOPSY / CURETTAGE      DILATATION AND CURETTAGE         Review of Systems   Constitutional:  Negative for activity change, appetite change, fatigue, fever, unexpected weight gain and unexpected weight loss.   HENT:  Negative for congestion, ear pain, hearing loss, nosebleeds, rhinorrhea, sore throat, tinnitus and trouble swallowing.    Eyes:  Negative for blurred vision, pain, discharge, itching and visual disturbance.   Respiratory:  Negative for apnea, chest tightness, shortness of breath and wheezing.    Cardiovascular:  Negative for chest pain and leg swelling.   Gastrointestinal:  Negative for abdominal pain, blood in stool, constipation, diarrhea, nausea, vomiting and GERD.    Endocrine: Negative for heat intolerance, polydipsia and polyuria.   Genitourinary: Negative.  Negative for breast lump, decreased libido, difficulty urinating, dyspareunia, dysuria, frequency, genital sores, hematuria, menstrual problem, pelvic pain, urgency, urinary incontinence and vaginal pain.   Musculoskeletal:  Negative for arthralgias, back pain, joint swelling and myalgias.   Skin:  Negative for color change, rash and skin lesions.   Allergic/Immunologic: Negative for environmental allergies, food allergies and immunocompromised state.   Neurological:  Negative for dizziness, tremors, seizures, syncope, facial asymmetry, numbness and headache.   Hematological:  Negative for adenopathy. Does not bruise/bleed easily.   Psychiatric/Behavioral:  Negative for agitation, hallucinations, sleep disturbance, suicidal ideas and depressed mood. The patient is not nervous/anxious.        Objective   Physical Exam  Vitals reviewed.   Constitutional:       General: She is not in acute distress.     Appearance: She is well-developed. She is not ill-appearing.   HENT:      Head: Normocephalic.      Right Ear: External ear normal.      Left Ear: External ear normal.      Nose: Nose normal.      Mouth/Throat:      Pharynx: No oropharyngeal exudate.   Eyes:      General: No scleral icterus.        Right eye: No discharge.         Left eye: No discharge.      Conjunctiva/sclera: Conjunctivae normal.      Pupils: Pupils are equal, round, and reactive to light.   Neck:      Thyroid: No thyroid mass or thyromegaly.   Cardiovascular:      Rate and Rhythm: Normal rate and regular rhythm.      Heart sounds: Normal heart sounds. No murmur heard.  Pulmonary:      Effort: Pulmonary effort is normal. No respiratory distress.      Breath sounds: Normal breath sounds. No wheezing or rales.   Chest:      Chest wall: No tenderness.   Abdominal:      General: Bowel sounds are normal. There is no distension.      Palpations: Abdomen is  "soft. There is no mass.      Tenderness: There is no abdominal tenderness. There is no guarding or rebound.      Hernia: No hernia is present.   Genitourinary:     Exam position: Prone.      Labia:         Right: No rash, tenderness or lesion.         Left: No rash, tenderness, lesion or injury.       Vagina: Normal. No vaginal discharge or tenderness.      Cervix: No cervical motion tenderness, discharge or friability.      Uterus: Not enlarged and not tender.       Adnexa:         Right: No mass or tenderness.          Left: No mass or tenderness.        Comments: Urethra and urethral meatus normal.    Bladder - normal, no prolapse.  Perineum and rectum examined - intact and no lesions.    Musculoskeletal:         General: No tenderness or deformity. Normal range of motion.      Cervical back: Normal range of motion and neck supple.   Lymphadenopathy:      Cervical: No cervical adenopathy.   Skin:     General: Skin is warm and dry.      Coloration: Skin is not pale.      Findings: No erythema or rash.   Neurological:      Mental Status: She is alert and oriented to person, place, and time.      Motor: No abnormal muscle tone.      Coordination: Coordination normal.      Deep Tendon Reflexes: Reflexes are normal and symmetric.   Psychiatric:         Behavior: Behavior normal. Behavior is cooperative.         Thought Content: Thought content normal.         Judgment: Judgment normal.           Vitals:    02/28/24 1057   BP: 128/84   BP Location: Left arm   Patient Position: Sitting   Cuff Size: Adult   Weight: 108 kg (237 lb)   Height: 160 cm (63\")       Diagnoses and all orders for this visit:    1. Encounter for well woman exam with routine gynecological exam (Primary)  Comments:  Normal well woman exam.  ThinPrep Pap smear done.    2. Encounter for screening for cervical cancer  -     Liquid-based Pap Smear, Screening          Normal GYN exam. Will have lab work here. Encouraged SBE.  Pt is aware how to do self " breast exam and the importance of same. Discussed weight management and importance of maintaining a healthy weight. Discussed Vitamin D intake and the importance of adequate vitamin D for both bone health and a healthy immune system.  Discussed daily exercise and the importance of same in regards to a healthy heart as well as helping to maintain her weight and improving her mental health.  Body mass index is 41.98 kg/m². Colonoscopy is not age appropriate.  Mammogram will be scheduled at not age appropriate. Pap smear is done per ASCCP guidelines.    Class 3 Severe Obesity (BMI >=40). Obesity-related health conditions include the following: hypertension. Obesity is unchanged. BMI is is above average; BMI management plan is completed. We discussed portion control and increasing exercise.             Non-Smoker    MyChart Instructions Given

## 2024-02-29 LAB
GEN CATEG CVX/VAG CYTO-IMP: NORMAL
HPV I/H RISK 4 DNA CVX QL PROBE+SIG AMP: NOT DETECTED
LAB AP CASE REPORT: NORMAL
LAB AP GYN ADDITIONAL INFORMATION: NORMAL
Lab: NORMAL
PATH INTERP SPEC-IMP: NORMAL
STAT OF ADQ CVX/VAG CYTO-IMP: NORMAL

## 2024-03-13 ENCOUNTER — PATIENT MESSAGE (OUTPATIENT)
Dept: OBSTETRICS AND GYNECOLOGY | Age: 34
End: 2024-03-13
Payer: COMMERCIAL

## 2024-03-14 ENCOUNTER — OFFICE VISIT (OUTPATIENT)
Dept: OBSTETRICS AND GYNECOLOGY | Age: 34
End: 2024-03-14
Payer: COMMERCIAL

## 2024-03-14 VITALS
SYSTOLIC BLOOD PRESSURE: 138 MMHG | WEIGHT: 232 LBS | BODY MASS INDEX: 41.11 KG/M2 | DIASTOLIC BLOOD PRESSURE: 88 MMHG | HEIGHT: 63 IN

## 2024-03-14 DIAGNOSIS — N93.9 EPISODE OF HEAVY VAGINAL BLEEDING: Primary | ICD-10-CM

## 2024-03-14 DIAGNOSIS — Z30.014 ENCOUNTER FOR INITIAL PRESCRIPTION OF INTRAUTERINE CONTRACEPTIVE DEVICE (IUD): ICD-10-CM

## 2024-03-14 LAB
B-HCG UR QL: NEGATIVE
EXPIRATION DATE: NORMAL
INTERNAL NEGATIVE CONTROL: NEGATIVE
INTERNAL POSITIVE CONTROL: POSITIVE
Lab: NORMAL

## 2024-03-14 RX ORDER — VALACYCLOVIR HYDROCHLORIDE 1 G/1
1 TABLET, FILM COATED ORAL EVERY 12 HOURS SCHEDULED
COMMUNITY
Start: 2024-02-29

## 2024-03-14 RX ORDER — LIOTHYRONINE SODIUM 5 UG/1
1 TABLET ORAL DAILY
COMMUNITY
Start: 2024-02-29

## 2024-03-14 RX ORDER — BUSPIRONE HYDROCHLORIDE 10 MG/1
1 TABLET ORAL DAILY
COMMUNITY
Start: 2024-02-29

## 2024-03-14 RX ORDER — MEDROXYPROGESTERONE ACETATE 5 MG/1
5 TABLET ORAL DAILY
Qty: 10 TABLET | Refills: 0 | Status: SHIPPED | OUTPATIENT
Start: 2024-03-14 | End: 2024-03-24

## 2024-03-14 NOTE — PROGRESS NOTES
Chief Complaint   Patient presents with    Menstrual Problem     Patient is here for evaluation of heavy bleeding. Pt had  delivery 23. First period was spotting 24.  Nothing since that time until 3/13/24, but describes bleeding has been extremely heavy with clots, filling a pad an hour.  Pt denies cramping or pelvic pain. Hx of PCOS and irregular periods prior to pregnancy. Pt is not breastfeeding.        History:  Maritza Vazquez is a 34 y.o. female who presents today for follow-up for evaluation of the above:    HPI    Patient is new to me today and presents for heavy menstrual bleeding. LMP 2024 was spotting. 24 started having heavy bleeding passing clots and saturating feminine products in less than an hour. Denies pelvic pain or cramping.  She has not taken a home pregnancy test.  Reports that she never took her OCP that was prescribed.   Patient states that prior to her most recent pregnancy she was having irregular periods.  Is not currently breast-feeding.            ROS:  Review of Systems   Constitutional: Negative.  Negative for fatigue.   HENT: Negative.     Eyes: Negative.    Respiratory: Negative.     Cardiovascular: Negative.    Gastrointestinal: Negative.    Endocrine: Negative.    Genitourinary: Negative.  Positive for menstrual problem and vaginal bleeding.   Musculoskeletal: Negative.    Skin: Negative.    Neurological: Negative.  Negative for weakness and light-headedness.   Psychiatric/Behavioral: Negative.         Ms. Vazquez  reports that she has quit smoking. Her smoking use included cigarettes and electronic cigarette. She has never used smokeless tobacco. She reports that she does not currently use alcohol. She reports that she does not use drugs.      Current Outpatient Medications:     acetaminophen (TYLENOL) 325 MG tablet, Take 2 tablets by mouth Every 6 (Six) Hours As Needed for Mild Pain., Disp: , Rfl:     albuterol (ACCUNEB) 1.25 MG/3ML nebulizer solution,  ", Disp: , Rfl:     busPIRone (BUSPAR) 10 MG tablet, Take 1 tablet by mouth Daily., Disp: , Rfl:     desvenlafaxine (Pristiq) 50 MG 24 hr tablet, Take 1 tablet by mouth Daily., Disp: 30 tablet, Rfl: 12    ergocalciferol (ERGOCALCIFEROL) 1.25 MG (52379 UT) capsule, ergocalciferol (vitamin D2) 1,250 mcg (50,000 unit) capsule  1 capsule weekly, Disp: , Rfl:     ibuprofen (ADVIL,MOTRIN) 200 MG tablet, Take 2 tablets by mouth Every 4 (Four) Hours As Needed for Mild Pain., Disp: , Rfl:     levothyroxine (SYNTHROID, LEVOTHROID) 50 MCG tablet, , Disp: , Rfl:     liothyronine (CYTOMEL) 5 MCG tablet, Take 1 tablet by mouth Daily., Disp: , Rfl:     metFORMIN (GLUCOPHAGE) 500 MG tablet, Take 2 tablets by mouth 2 (Two) Times a Day With Meals., Disp: 120 tablet, Rfl: 3    prenatal vitamin (prenatal, CLASSIC, vitamin) tablet, Take 1 tablet by mouth Daily., Disp: , Rfl:     propranolol (INDERAL) 20 MG tablet, Take 1 tablet by mouth Every Night., Disp: , Rfl:     valACYclovir (VALTREX) 1000 MG tablet, Take 1 tablet by mouth Every 12 (Twelve) Hours., Disp: , Rfl:     Levonorgestrel (MIRENA) 20 MCG/DAY intrauterine device IUD, 1 each by Intrauterine route 1 (One) Time for 1 dose., Disp: 1 each, Rfl: 0    medroxyPROGESTERone (Provera) 5 MG tablet, Take 1 tablet by mouth Daily for 10 days., Disp: 10 tablet, Rfl: 0      OBJECTIVE:  /88   Ht 160 cm (63\")   Wt 105 kg (232 lb)   LMP 03/13/2024 (Exact Date)   BMI 41.10 kg/m²    Physical Exam  Exam conducted with a chaperone present.   Constitutional:       Appearance: She is not ill-appearing.   Genitourinary:     Vagina: Bleeding present.      Cervix: Cervical bleeding present.      Comments: Small bleeding.  Patient removed tampon prior to exam  Neurological:      Mental Status: She is oriented to person, place, and time.   Psychiatric:         Behavior: Behavior normal.         Assessment/Plan    Diagnoses and all orders for this visit:    1. Episode of heavy vaginal bleeding " "(Primary)  Comments:  Urine pregnancy test in the office today is negative.  We will do 10 days of Provera to help slow current heavy bleeding.  Return to ER precautions discussed.  Orders:  -     POC Pregnancy, Urine  -     medroxyPROGESTERone (Provera) 5 MG tablet; Take 1 tablet by mouth Daily for 10 days.  Dispense: 10 tablet; Refill: 0    2. Encounter for initial prescription of intrauterine contraceptive device (IUD)  -     Levonorgestrel (MIRENA) 20 MCG/DAY intrauterine device IUD; 1 each by Intrauterine route 1 (One) Time for 1 dose.  Dispense: 1 each; Refill: 0    No birth control works 100 percent perfectly all the time.  Hormonal birth control is a safe and reliable way to prevent pregnancy for most people. But it does not protect you from infections that spread through sex. Condoms are the only form of birth control that can also protect against infections that you can get through sex. (These are called sexually transmitted infections, or \"STIs.\") They are a good choice if you don't know your partner's sexual history or if you or a partner already has an STI. Some people use condoms in addition to another type of birth control, such as the pill.       An After Visit Summary was printed and given to the patient at discharge.  Return for IUD insertion. Sooner if problems arise.          Mile OLIVA. 3/14/2024   Electronically Signed  "

## 2024-05-02 ENCOUNTER — PROCEDURE VISIT (OUTPATIENT)
Dept: OBSTETRICS AND GYNECOLOGY | Age: 34
End: 2024-05-02
Payer: COMMERCIAL

## 2024-05-02 VITALS
DIASTOLIC BLOOD PRESSURE: 80 MMHG | RESPIRATION RATE: 18 BRPM | SYSTOLIC BLOOD PRESSURE: 140 MMHG | WEIGHT: 240 LBS | HEIGHT: 63 IN | BODY MASS INDEX: 42.52 KG/M2

## 2024-05-02 DIAGNOSIS — Z30.430 ENCOUNTER FOR IUD INSERTION: Primary | ICD-10-CM

## 2024-05-02 LAB
B-HCG UR QL: NEGATIVE
EXPIRATION DATE: NORMAL
INTERNAL NEGATIVE CONTROL: NORMAL
INTERNAL POSITIVE CONTROL: NORMAL
Lab: NORMAL

## 2024-05-02 RX ORDER — AMOXICILLIN AND CLAVULANATE POTASSIUM 875; 125 MG/1; MG/1
TABLET, FILM COATED ORAL
COMMUNITY
Start: 2024-05-01

## 2024-05-02 NOTE — PROGRESS NOTES
Procedures  IUD Insertion Procedure Note    Indication: Desires long acting reversible contraception     Procedure Details   Urine pregnancy test was done and was NEGATIVE .  The risks (including infection, bleeding, pain, and uterine perforation) and benefits of the procedure were explained to the patient and Written informed consent was obtained.      Cervix cleansed with Betadine. Uterus sounded to 6 cm. IUD inserted without difficulty. String visible and trimmed.    IUD Information:  Mirena.    Condition:  Stable    Complications:  None    Patient tolerated the procedure well without complications.    Plan:  The patient was advised to call for any fever or for prolonged or severe pain or bleeding. She was advised to use Naproxen as needed for mild to moderate pain.     Sherry Park, PJ  5/2/2024  15:14 CDT

## 2024-05-04 LAB
C TRACH RRNA SPEC QL NAA+PROBE: NEGATIVE
N GONORRHOEA RRNA SPEC QL NAA+PROBE: NEGATIVE
T VAGINALIS RRNA SPEC QL NAA+PROBE: NEGATIVE

## 2024-05-17 ENCOUNTER — OFFICE VISIT (OUTPATIENT)
Dept: OBSTETRICS AND GYNECOLOGY | Age: 34
End: 2024-05-17
Payer: COMMERCIAL

## 2024-05-17 VITALS
DIASTOLIC BLOOD PRESSURE: 70 MMHG | WEIGHT: 240 LBS | BODY MASS INDEX: 42.52 KG/M2 | HEIGHT: 63 IN | SYSTOLIC BLOOD PRESSURE: 132 MMHG | RESPIRATION RATE: 18 BRPM

## 2024-05-17 DIAGNOSIS — Z30.431 IUD CHECK UP: Primary | ICD-10-CM

## 2024-05-17 RX ORDER — PHENTERMINE HYDROCHLORIDE 37.5 MG/1
TABLET ORAL
COMMUNITY
Start: 2024-05-01

## 2024-05-17 NOTE — PROGRESS NOTES
Subjective   Maritza Vazquez is a 34 y.o. female  YOB: 1990      Chief Complaint   Patient presents with    Follow-up     Patient presents today for IUD follow up. US today. Patient has had some cramps.        Contraception  This is a new problem. The current episode started 1 to 4 weeks ago. Pertinent negatives include no abdominal pain, arthralgias, chest pain, chills, congestion, coughing, diaphoresis, fatigue, fever, headaches, joint swelling, myalgias, nausea, neck pain, numbness, rash, sore throat, vomiting or weakness.       The following portions of the patient's history were reviewed and updated as appropriate: allergies, current medications, past family history, past medical history, past social history, past surgical history, and problem list.    No Known Allergies    Past Medical History:   Diagnosis Date    Abnormal Pap smear of cervix 2007ish?    Depression 2009ish?    Have struggled with depression and anxiety for years    Gestational diabetes 2023    Goiter     Migraine     PCOS (polycystic ovarian syndrome)     Polycystic ovary syndrome        Family History   Problem Relation Age of Onset    No Known Problems Father     No Known Problems Mother     No Known Problems Brother     Diabetes Paternal Grandfather     Colon cancer Maternal Grandfather     No Known Problems Brother     Breast cancer Neg Hx     Ovarian cancer Neg Hx        Social History     Socioeconomic History    Marital status:    Tobacco Use    Smoking status: Former     Current packs/day: 0.10     Types: Cigarettes, Electronic Cigarette    Smokeless tobacco: Never   Vaping Use    Vaping status: Former    Substances: Nicotine   Substance and Sexual Activity    Alcohol use: Not Currently    Drug use: No    Sexual activity: Yes     Partners: Male     Birth control/protection: None         Current Outpatient Medications:     acetaminophen (TYLENOL) 325 MG tablet, Take 2 tablets by mouth Every 6 (Six) Hours As  Needed for Mild Pain., Disp: , Rfl:     busPIRone (BUSPAR) 10 MG tablet, Take 1 tablet by mouth Daily., Disp: , Rfl:     desvenlafaxine (Pristiq) 50 MG 24 hr tablet, Take 1 tablet by mouth Daily., Disp: 30 tablet, Rfl: 12    ergocalciferol (ERGOCALCIFEROL) 1.25 MG (92341 UT) capsule, ergocalciferol (vitamin D2) 1,250 mcg (50,000 unit) capsule  1 capsule weekly, Disp: , Rfl:     ibuprofen (ADVIL,MOTRIN) 200 MG tablet, Take 2 tablets by mouth Every 4 (Four) Hours As Needed for Mild Pain., Disp: , Rfl:     levothyroxine (SYNTHROID, LEVOTHROID) 50 MCG tablet, , Disp: , Rfl:     liothyronine (CYTOMEL) 5 MCG tablet, Take 1 tablet by mouth Daily., Disp: , Rfl:     metFORMIN (GLUCOPHAGE) 500 MG tablet, Take 2 tablets by mouth 2 (Two) Times a Day With Meals., Disp: 120 tablet, Rfl: 3    phentermine (ADIPEX-P) 37.5 MG tablet, TAKE ONE TABLET BY MOUTH EVERY DAY AFTER BREAKFAST, Disp: , Rfl:     prenatal vitamin (prenatal, CLASSIC, vitamin) tablet, Take 1 tablet by mouth Daily., Disp: , Rfl:     propranolol (INDERAL) 20 MG tablet, Take 1 tablet by mouth Every Night., Disp: , Rfl:     valACYclovir (VALTREX) 1000 MG tablet, Take 1 tablet by mouth Every 12 (Twelve) Hours., Disp: , Rfl:     Levonorgestrel (MIRENA) 20 MCG/DAY intrauterine device IUD, 1 each by Intrauterine route 1 (One) Time for 1 dose., Disp: 1 each, Rfl: 0    Patient's last menstrual period was 05/15/2024.    Sexual History:         Could not be calculated    Past Surgical History:   Procedure Laterality Date     SECTION N/A 2023    Procedure:  SECTION PRIMARY;  Surgeon: Abner Galvez MD;  Location: [facility] LABOR DELIVERY;  Service: Obstetrics/Gynecology;  Laterality: N/A;    COLPOSCOPY W/ BIOPSY / CURETTAGE      DILATATION AND CURETTAGE         Review of Systems   Constitutional:  Negative for activity change, appetite change, chills, diaphoresis, fatigue, fever and unexpected weight change.   HENT:  Negative for congestion, dental  problem, drooling, ear discharge, ear pain, facial swelling, hearing loss, mouth sores, nosebleeds, postnasal drip, rhinorrhea, sinus pressure, sinus pain, sneezing, sore throat, tinnitus, trouble swallowing and voice change.    Eyes:  Negative for photophobia, pain, discharge, redness, itching and visual disturbance.   Respiratory:  Negative for apnea, cough, choking, chest tightness, shortness of breath, wheezing and stridor.    Cardiovascular:  Negative for chest pain, palpitations and leg swelling.   Gastrointestinal:  Negative for abdominal distention, abdominal pain, anal bleeding, blood in stool, constipation, diarrhea, nausea, rectal pain and vomiting.   Endocrine: Negative for cold intolerance, heat intolerance, polydipsia, polyphagia and polyuria.   Genitourinary:  Negative for decreased urine volume, difficulty urinating, dyspareunia, dysuria, enuresis, flank pain, frequency, genital sores, hematuria, menstrual problem, pelvic pain, urgency, vaginal bleeding, vaginal discharge and vaginal pain.   Musculoskeletal:  Negative for arthralgias, back pain, gait problem, joint swelling, myalgias, neck pain and neck stiffness.   Skin:  Negative for color change, pallor, rash and wound.   Allergic/Immunologic: Negative for environmental allergies, food allergies and immunocompromised state.   Neurological:  Negative for dizziness, tremors, seizures, syncope, facial asymmetry, speech difficulty, weakness, light-headedness, numbness and headaches.   Hematological:  Negative for adenopathy. Does not bruise/bleed easily.   Psychiatric/Behavioral:  Negative for agitation, behavioral problems, confusion, decreased concentration, dysphoric mood, hallucinations, self-injury, sleep disturbance and suicidal ideas. The patient is not nervous/anxious and is not hyperactive.        Objective   Physical Exam  Vitals and nursing note reviewed.   Constitutional:       Appearance: She is well-developed.   HENT:      Head:  "Normocephalic.   Eyes:      Pupils: Pupils are equal, round, and reactive to light.   Cardiovascular:      Rate and Rhythm: Normal rate and regular rhythm.   Pulmonary:      Effort: Pulmonary effort is normal.      Breath sounds: Normal breath sounds.   Abdominal:      Palpations: Abdomen is soft.   Musculoskeletal:         General: Normal range of motion.      Cervical back: Normal range of motion.   Skin:     General: Skin is warm and dry.   Neurological:      Mental Status: She is alert and oriented to person, place, and time.   Psychiatric:         Behavior: Behavior normal.           Vitals:    05/17/24 1440   BP: 132/70   Resp: 18   Weight: 109 kg (240 lb)   Height: 160 cm (63\")       Diagnoses and all orders for this visit:    1. IUD check up (Primary)  Comments:  Ultrasound done today shows IUD in proper placement.  RTO for yearly or sooner as needed.                        Non-Smoker    MyChart Instructions Given       "

## 2025-03-07 ENCOUNTER — TELEPHONE (OUTPATIENT)
Dept: OBSTETRICS AND GYNECOLOGY | Age: 35
End: 2025-03-07
Payer: COMMERCIAL

## 2025-03-07 NOTE — TELEPHONE ENCOUNTER
Pt called reporting that she passed her IUD this morning. Pt reports she has been bleeding the last few days and that IUD was completely intact. Pt denies any pelvic pain or heavy bleeding at this time. Pt concerned if she may be pregnant if IUD was not in place. Pt reports vomiting and nausea. Pt advised to take test when she gets home and to keep scheduled appt 3/11. Pt advised if she is bleeding though pad/tampon in 1 hour or having severe pelvic pain to go to ER for evaluation. Pt voiced understanding.

## 2025-03-11 ENCOUNTER — OFFICE VISIT (OUTPATIENT)
Dept: OBSTETRICS AND GYNECOLOGY | Age: 35
End: 2025-03-11
Payer: COMMERCIAL

## 2025-03-11 VITALS
SYSTOLIC BLOOD PRESSURE: 128 MMHG | BODY MASS INDEX: 40.22 KG/M2 | WEIGHT: 227 LBS | HEIGHT: 63 IN | DIASTOLIC BLOOD PRESSURE: 84 MMHG

## 2025-03-11 DIAGNOSIS — T83.9XXA COMPLICATION OF INTRAUTERINE DEVICE (IUD), UNSPECIFIED COMPLICATION, INITIAL ENCOUNTER: ICD-10-CM

## 2025-03-11 DIAGNOSIS — Z01.419 WELL WOMAN EXAM WITH ROUTINE GYNECOLOGICAL EXAM: Primary | ICD-10-CM

## 2025-03-11 DIAGNOSIS — N93.8 DUB (DYSFUNCTIONAL UTERINE BLEEDING): ICD-10-CM

## 2025-03-11 RX ORDER — MONTELUKAST SODIUM 10 MG/1
10 TABLET ORAL EVERY EVENING
COMMUNITY
Start: 2025-02-12

## 2025-03-11 RX ORDER — DESVENLAFAXINE 100 MG/1
100 TABLET, EXTENDED RELEASE ORAL DAILY
COMMUNITY
Start: 2025-02-12

## 2025-03-11 RX ORDER — CLOTRIMAZOLE AND BETAMETHASONE DIPROPIONATE 10; .64 MG/G; MG/G
CREAM TOPICAL
COMMUNITY
Start: 2024-12-31

## 2025-03-11 RX ORDER — FAMOTIDINE 40 MG/1
TABLET, FILM COATED ORAL
COMMUNITY
Start: 2025-02-12

## 2025-03-11 RX ORDER — ALBUTEROL SULFATE 90 UG/1
INHALANT RESPIRATORY (INHALATION)
COMMUNITY
Start: 2025-02-12

## 2025-03-11 RX ORDER — LEVOCETIRIZINE DIHYDROCHLORIDE 5 MG/1
TABLET, FILM COATED ORAL
COMMUNITY
Start: 2025-02-12

## 2025-03-11 RX ORDER — TRIAMCINOLONE ACETONIDE 1 MG/G
CREAM TOPICAL
COMMUNITY
Start: 2025-01-22

## 2025-03-11 NOTE — PROGRESS NOTES
"Subjective     Maritza Vazquez is a 35 y.o. female    History of Present Illness  The patient presents to [facility] office today for completion of her WWE. She reports last Friday that she experienced some lower back cramping, and after using the bathroom, she noticed her IUD had spontaneously came out in a large blood clot. She has been bleeding since this timeframe with passing of large blood clots at times.   Gynecologic Exam  The patient's primary symptoms include vaginal bleeding. The patient's pertinent negatives include no genital itching, genital lesions, genital odor, genital rash, missed menses, pelvic pain or vaginal discharge. This is a recurrent problem. The current episode started in the past 7 days. The problem occurs constantly. The problem has been unchanged. The patient is experiencing no pain. The problem affects the vaginal only side. Pertinent negatives include no abdominal pain, anorexia, back pain, chills, constipation, diarrhea, discolored urine, dysuria, fever, flank pain, frequency, headaches, hematuria, joint pain, joint swelling, nausea, painful intercourse, rash, sore throat, urgency or vomiting. The vaginal discharge was bloody. The vaginal bleeding is typical of menses. She has been passing clots. She has not been passing tissue. Nothing aggravates the symptoms. She has tried nothing for the symptoms. The treatment provided no relief. She is sexually active. No, her partner does not have an STD. She uses nothing for contraception. Menstrual history: Recent implant. The maximum temperature recorded prior to her arrival was no fever.         /84   Ht 160 cm (63\")   Wt 103 kg (227 lb)   LMP 03/03/2025 (Exact Date)   BMI 40.21 kg/m²     Outpatient Encounter Medications as of 3/11/2025   Medication Sig Dispense Refill    acetaminophen (TYLENOL) 325 MG tablet Take 2 tablets by mouth Every 6 (Six) Hours As Needed for Mild Pain.      albuterol sulfate  (90 Base) MCG/ACT " inhaler INHALE 1-2 PUFFS BY MOUTH EVERY 4 TO 6 HOURS AS NEEDED FOR COUGH/ SHORTNESS OF BREATH / WHEEZING      busPIRone (BUSPAR) 10 MG tablet Take 1 tablet by mouth Daily.      clotrimazole-betamethasone (LOTRISONE) 1-0.05 % cream       desvenlafaxine (PRISTIQ) 100 MG 24 hr tablet Take 1 tablet by mouth Daily.      ergocalciferol (ERGOCALCIFEROL) 1.25 MG (99733 UT) capsule ergocalciferol (vitamin D2) 1,250 mcg (50,000 unit) capsule   1 capsule weekly      famotidine (PEPCID) 40 MG tablet TAKE ONE TABLET EVERY DAY FOR STOMACH ACID      levocetirizine (XYZAL) 5 MG tablet TAKE ONE TABLET EVERY DAY FOR ALLERGY      metFORMIN (GLUCOPHAGE) 500 MG tablet Take 2 tablets by mouth 2 (Two) Times a Day With Meals. 120 tablet 3    montelukast (SINGULAIR) 10 MG tablet Take 1 tablet by mouth Every Evening.      propranolol (INDERAL) 20 MG tablet Take 1 tablet by mouth Every Night.      triamcinolone (KENALOG) 0.1 % cream APPLY TOPICALLY TO SKIN TWICE DAILY      valACYclovir (VALTREX) 1000 MG tablet Take 1 tablet by mouth Every 12 (Twelve) Hours.      [DISCONTINUED] desvenlafaxine (Pristiq) 50 MG 24 hr tablet Take 1 tablet by mouth Daily. 30 tablet 12    [DISCONTINUED] Levonorgestrel (MIRENA) 20 MCG/DAY intrauterine device IUD 1 each by Intrauterine route 1 (One) Time for 1 dose. 1 each 0    [DISCONTINUED] levothyroxine (SYNTHROID, LEVOTHROID) 50 MCG tablet       [DISCONTINUED] liothyronine (CYTOMEL) 5 MCG tablet Take 1 tablet by mouth Daily.      [DISCONTINUED] phentermine (ADIPEX-P) 37.5 MG tablet TAKE ONE TABLET BY MOUTH EVERY DAY AFTER BREAKFAST      [DISCONTINUED] prenatal vitamin (prenatal, CLASSIC, vitamin) tablet Take 1 tablet by mouth Daily.       No facility-administered encounter medications on file as of 3/11/2025.       Past Medical History  Past Medical History:   Diagnosis Date    Abnormal Pap smear of cervix 2007ish?    Depression 2009ish?    Gestational diabetes 2023    Goiter     Migraine     PCOS (polycystic  ovarian syndrome)     Polycystic ovary syndrome        Surgical History  Past Surgical History:   Procedure Laterality Date     SECTION N/A 2023    Procedure:  SECTION PRIMARY;  Surgeon: Abner Galvez MD;  Location: [facility] LABOR DELIVERY;  Service: Obstetrics/Gynecology;  Laterality: N/A;    COLPOSCOPY W/ BIOPSY / CURETTAGE      DILATATION AND CURETTAGE         Family History  Family History   Problem Relation Age of Onset    No Known Problems Father     No Known Problems Mother     No Known Problems Brother     Diabetes Paternal Grandfather     Colon cancer Maternal Grandfather     No Known Problems Brother     Breast cancer Neg Hx     Ovarian cancer Neg Hx        The following portions of the patient's history were reviewed and updated as appropriate: allergies, current medications, past family history, past medical history, past social history, past surgical history, and problem list.    Review of Systems   Constitutional: Negative.  Negative for chills and fever.   HENT: Negative.  Negative for sore throat.    Eyes: Negative.    Respiratory: Negative.     Cardiovascular: Negative.    Gastrointestinal: Negative.  Negative for abdominal pain, anorexia, constipation, diarrhea, nausea and vomiting.   Endocrine: Negative.    Genitourinary:  Positive for menstrual problem and vaginal bleeding. Negative for breast discharge, breast lump, breast pain, dysuria, flank pain, frequency, hematuria, missed menses, pelvic pain, urgency and vaginal discharge.   Musculoskeletal: Negative.  Negative for back pain and joint pain.   Skin: Negative.  Negative for rash.   Allergic/Immunologic: Negative.    Neurological: Negative.    Hematological: Negative.    Psychiatric/Behavioral: Negative.         Objective   Physical Exam  Vitals and nursing note reviewed. Exam conducted with a chaperone present.   Constitutional:       General: She is not in acute distress.     Appearance: She is well-developed. She  is obese. She is not diaphoretic.   HENT:      Head: Normocephalic.      Right Ear: External ear normal.      Left Ear: External ear normal.      Nose: Nose normal.   Eyes:      General: No scleral icterus.        Right eye: No discharge.         Left eye: No discharge.      Conjunctiva/sclera: Conjunctivae normal.      Pupils: Pupils are equal, round, and reactive to light.   Neck:      Thyroid: No thyromegaly.      Vascular: No carotid bruit.      Trachea: No tracheal deviation.   Cardiovascular:      Rate and Rhythm: Normal rate and regular rhythm.      Pulses: Normal pulses.      Heart sounds: Normal heart sounds. No murmur heard.  Pulmonary:      Effort: Pulmonary effort is normal. No respiratory distress.      Breath sounds: Normal breath sounds. No wheezing.   Chest:   Breasts:     Breasts are symmetrical.      Right: Normal. No swelling, bleeding, inverted nipple, mass, nipple discharge, skin change or tenderness.      Left: Normal. No swelling, bleeding, inverted nipple, mass, nipple discharge, skin change or tenderness.   Abdominal:      General: Bowel sounds are normal. There is no distension.      Palpations: Abdomen is soft. There is no mass.      Tenderness: There is no abdominal tenderness. There is no right CVA tenderness, left CVA tenderness or guarding.      Hernia: No hernia is present. There is no hernia in the left inguinal area or right inguinal area.   Genitourinary:     General: Normal vulva.      Exam position: Lithotomy position.      Labia:         Right: No rash, tenderness, lesion or injury.         Left: No rash, tenderness, lesion or injury.       Vagina: Normal. No signs of injury and foreign body. No vaginal discharge, erythema, tenderness or bleeding.      Cervix: Cervical bleeding (large blood clots present) present.      Uterus: Normal. Not enlarged, not fixed and not tender.       Adnexa: Right adnexa normal and left adnexa normal.        Right: No mass, tenderness or fullness.           Left: No mass, tenderness or fullness.        Rectum: Normal. No mass.      Comments:   BSU normal  Urethral meatus  Normal  Perineum  Normal  Musculoskeletal:         General: No tenderness. Normal range of motion.      Cervical back: Normal range of motion and neck supple.   Lymphadenopathy:      Head:      Right side of head: No submental, submandibular, tonsillar, preauricular, posterior auricular or occipital adenopathy.      Left side of head: No submental, submandibular, tonsillar, preauricular, posterior auricular or occipital adenopathy.      Cervical: No cervical adenopathy.      Right cervical: No superficial, deep or posterior cervical adenopathy.     Left cervical: No superficial, deep or posterior cervical adenopathy.      Upper Body:      Right upper body: No supraclavicular, axillary or pectoral adenopathy.      Left upper body: No supraclavicular, axillary or pectoral adenopathy.      Lower Body: No right inguinal adenopathy. No left inguinal adenopathy.   Skin:     General: Skin is warm and dry.      Findings: No bruising, erythema or rash.   Neurological:      Mental Status: She is alert and oriented to person, place, and time.      Coordination: Coordination normal.   Psychiatric:         Mood and Affect: Mood normal.         Behavior: Behavior normal.         Thought Content: Thought content normal.         Judgment: Judgment normal.         PHQ-9 Depression Screening  Little interest or pleasure in doing things? Not at all   Feeling down, depressed, or hopeless? Not at all   PHQ-2 Total Score 0   Trouble falling or staying asleep, or sleeping too much?     Feeling tired or having little energy?     Poor appetite or overeating?     Feeling bad about yourself - or that you are a failure or have let yourself or your family down?     Trouble concentrating on things, such as reading the newspaper or watching television?     Moving or speaking so slowly that other people could have noticed? Or  the opposite - being so fidgety or restless that you have been moving around a lot more than usual?     Thoughts that you would be better off dead, or of hurting yourself in some way?     PHQ-9 Total Score     If you checked off any problems, how difficult have these problems made it for you to do your work, take care of things at home, or get along with other people?          Assessment & Plan   Diagnoses and all orders for this visit:    1. Well woman exam with routine gynecological exam (Primary)  Comments:  The patient presents to the office today for her WWE. She is established with her pcp and screening labs are current. Breast exam normal in office today. Pelvic exam completed. Patient's last cervical screening pap smear was on completed 2/28/2024 with normal results. She is bleeding heavy today with passing of large blood clots. Pelvic exam completed, cervix visualized.     2. Complication of intrauterine device (IUD), unspecified complication, initial encounter  Comments:  Patient reports last Friday night she did experience some intermittent low back cramping. After using the bathroom, she noticed full expulsion of iud within a large blood clot. She did take two pictures of the device for which I was able to view. You can clearly see the arms, full stalk/stem of device, and strings. UPT negative in office today. She does not wish to restart contraception at this time.   Orders:  -     POC Pregnancy, Urine    3. DUB (dysfunctional uterine bleeding)  Comments:  She reports prior to IUD insertion, periods were always very heavy, painful, with passing of multiple large blood clots. IUD only slowed bleeding but did not stop it. She does not wish to start contraception today. She would like to discuss surgical options with MD.          Normal GYN exam. Encouraged SBE, pt is aware how to do self breast exam and the importance of same. Discussed weight management and importance of maintaining a healthy weight.  Discussed Vitamin D intake and the importance of adequate vitamin D for both bone health and a healthy immune system.  Discussed daily exercise and the importance of same, in regards to a healthy heart as well as helping to maintain her weight and improving her mental health.  Colonoscopy is not indicated. Mammogram is not indicated. Bone density is not indicated. Pap smear is not done per ASCCP guidelines. HPV is not done. Lab work up is up to date.      Class 3 Severe Obesity (BMI >=40). Obesity-related health conditions include the following: diabetes mellitus. Obesity is unchanged. BMI is is above average; BMI management plan is completed. We discussed portion control, increasing exercise, and Information on healthy weight added to patient's after visit summary.      Bri Murphy, APRN  3/11/2025

## 2025-04-14 ENCOUNTER — OFFICE VISIT (OUTPATIENT)
Age: 35
End: 2025-04-14
Payer: COMMERCIAL

## 2025-04-14 VITALS
WEIGHT: 232 LBS | SYSTOLIC BLOOD PRESSURE: 104 MMHG | HEIGHT: 63 IN | DIASTOLIC BLOOD PRESSURE: 70 MMHG | BODY MASS INDEX: 41.11 KG/M2

## 2025-04-14 DIAGNOSIS — N92.1 MENORRHAGIA WITH IRREGULAR CYCLE: Primary | ICD-10-CM

## 2025-04-14 PROCEDURE — 99213 OFFICE O/P EST LOW 20 MIN: CPT | Performed by: OBSTETRICS & GYNECOLOGY

## 2025-04-14 RX ORDER — LEVOTHYROXINE SODIUM 50 UG/1
TABLET ORAL
COMMUNITY
Start: 2025-04-08

## 2025-04-14 RX ORDER — TRANEXAMIC ACID 650 MG/1
1300 TABLET ORAL 3 TIMES DAILY
Qty: 30 TABLET | Refills: 5 | Status: SHIPPED | OUTPATIENT
Start: 2025-04-14

## 2025-04-14 RX ORDER — DULAGLUTIDE 0.75 MG/.5ML
INJECTION, SOLUTION SUBCUTANEOUS
COMMUNITY
Start: 2025-04-02

## 2025-04-14 NOTE — PROGRESS NOTES
"Maritza Vazquez is a 35 y.o. female here today to discuss treatment of menorrhagia with irregular menstrual cycles.  She had a Mirena IUD in place for about a year, but it was recently expelled.  She reports that her menstrual flow was a little lighter with the IUD in place, and in the past she has tried multiple oral contraceptive pills as well as NSAIDs.  She reports recent normal thyroid testing and blood count.  Her menstrual cycles are at times irregular, and she does have a history of PCOS for which she takes metformin.  She is considering 1 more pregnancy if she can achieve pregnancy before a certain timeframe, perhaps the end of the year.    Visit Vitals  /70   Ht 160 cm (63\")   Wt 105 kg (232 lb)   LMP 03/28/2025 (Exact Date)   BMI 41.10 kg/m²     Pleasant female no acute distress  Mood and affect normal  Breathing unlabored    Assessment: Menorrhagia with irregular menstrual cycle    Since she is contemplating pregnancy I recommend that she start a prenatal vitamin.  I have given her a prescription for tranexamic acid 1300 mg 3 times a day x 5 days with each menstrual cycle.  She will likely need help with ovulation and conception given her history of PCOS.  She will contact the office when her next cycle begins, so that we can send in a prescription for Femara.  We will then proceed with home ovulation testing and a day 21 progesterone level.  Call with other questions or concerns.      "

## (undated) DEVICE — SUT VIC RAPD SZ 3/0 27IN PS1 VR935

## (undated) DEVICE — GLV SURG SENSICARE SLT PF LF 8 STRL

## (undated) DEVICE — SPNG GZ WOVN 4X4IN 12PLY 10/BX STRL

## (undated) DEVICE — 3M™ STERI-STRIP™ BLEND TONE SKIN CLOSURES, B1557, TAN, 1/2 IN X 4 IN (12MM X 100MM), 6 STRIPS/ENVELOPE: Brand: 3M™ STERI-STRIP™

## (undated) DEVICE — SUT VIC RAPD SZ 2/0 36IN CT1 VR945 BX/12

## (undated) DEVICE — TBG PENCL TELESCP MEGADYNE SMOKE EVAC 10FT

## (undated) DEVICE — CVR HNDL LIGHT RIGID

## (undated) DEVICE — SUT VIC 0 CTX 36IN J978H

## (undated) DEVICE — SUT GUT PLAIN TPR PT 2/0 27IN 53T

## (undated) DEVICE — PAD C-SECTION: Brand: MEDLINE INDUSTRIES, INC.

## (undated) DEVICE — 3M™ MEDIPORE™ H SOFT CLOTH SURGICAL TAPE 2868, 8 INCH X 10 YARD (20,3CM X 9,1M), 6 ROLLS/CASE: Brand: 3M™ MEDIPORE™

## (undated) DEVICE — ADHS LIQ MASTISOL 2/3ML

## (undated) DEVICE — Device

## (undated) DEVICE — PATIENT RETURN ELECTRODE, SINGLE-USE, CONTACT QUALITY MONITORING, ADULT, WITH 15 FT (4.5 M) CORD. FOR PATIENTS WEIGHING OVER 33LBS. (15KG): Brand: MEGADYNE

## (undated) DEVICE — GOWN,PREVENTION PLUS,XLARGE,STERILE: Brand: MEDLINE

## (undated) DEVICE — LARGE, DISPOSABLE C-SECTION RETRACTOR: Brand: ALEXIS ® O C-SECTION PROTECTOR/RETRACTOR

## (undated) DEVICE — APPL CHLORAPREP HI/LITE 26ML ORNG

## (undated) DEVICE — 3M™ STERI-STRIP™ REINFORCED ADHESIVE SKIN CLOSURES, R1547, 1/2 IN X 4 IN (12 MM X 100 MM), 6 STRIPS/ENVELOPE: Brand: 3M™ STERI-STRIP™

## (undated) DEVICE — KENDALL SCD EXPRESS SLEEVES, KNEE LENGTH, LARGE: Brand: KENDALL SCD

## (undated) DEVICE — SUT MNCRYL 0/0 CTX 36IN Y398H